# Patient Record
Sex: MALE | Race: ASIAN | NOT HISPANIC OR LATINO | Employment: UNEMPLOYED | URBAN - METROPOLITAN AREA
[De-identification: names, ages, dates, MRNs, and addresses within clinical notes are randomized per-mention and may not be internally consistent; named-entity substitution may affect disease eponyms.]

---

## 2023-01-12 ENCOUNTER — TRANSFERRED RECORDS (OUTPATIENT)
Dept: HEALTH INFORMATION MANAGEMENT | Facility: CLINIC | Age: 28
End: 2023-01-12

## 2023-10-30 ENCOUNTER — TRANSFERRED RECORDS (OUTPATIENT)
Dept: HEALTH INFORMATION MANAGEMENT | Facility: CLINIC | Age: 28
End: 2023-10-30
Payer: COMMERCIAL

## 2023-10-30 LAB — PHQ9 SCORE: 0

## 2023-12-22 ENCOUNTER — TELEPHONE (OUTPATIENT)
Dept: BEHAVIORAL HEALTH | Facility: CLINIC | Age: 28
End: 2023-12-22

## 2023-12-22 NOTE — TELEPHONE ENCOUNTER
12/22/2023    Received a voicemail from [facility]  Jaskaranmaureen Harris. She was inquiring where pt is at on the wait list.     I called her back to clarify, the referral from 10/30/2023. She reported yes, and when she sent it over she was told the wait list was into January so they were checking on that status. I told her I would reach out to admissions to figure that out. Told CM that I would call back or admissions would.    Number: [phone]    Sent an email to  Admissions Lucinda PAYNE and Anmol PAZ regarding this information.     Lisa Dudley [facility] - Patient Navigator   [email or web address]  Direct phone: [phone]

## 2024-01-05 NOTE — TELEPHONE ENCOUNTER
Jessica Harris called writer to follow up on referral for patient. Per Assessment scanned into epic that was completed on October 30th 2023. Pt would need an updated assessment.  was wondering why it took so long to be reviewed. Caller was transferred to [facility] Admissions for follow up.

## 2024-01-16 NOTE — TELEPHONE ENCOUNTER
Writer received assessment [facility]. [facility].    Pt is on [facility] wait list language services as pt needs a Shira .     There is no listed contact information for either the  or the patient on the assessment.

## 2024-01-22 NOTE — TELEPHONE ENCOUNTER
Rec'd vm from someone at [place] (name was indistinct on vm) req'ing an update on the referral made 1-16-24. Writer teo reporting that there is no status change in the referral due to pt appearing not to have health insurance and [facility] not providing a contact number, and inviting the agency to call back.

## 2024-01-26 NOTE — TELEPHONE ENCOUNTER
Tiffanier spoke with the patient via phone with [facility]  ID# [id number]  (April) to schedule a direct admit for lodging plus on Wednesday 1/31/24 at 10am.    You must remain sober from ALL substances for 24 hours prior to admission otherwise you will not be allowed to admit into [facility]      A urine drug screen and breathalyzer will be required when you arrive on campus, please do not use the restroom until testing has been completed      You can bring a cell phone for virtual visitation or virtual Dr. Visits. In person visitation will be on Sunday from 1:30pm-4:30pm. Pt will be allowed to have one person approved for visitation. No children UNDER 5 allowed. Virtual Visitation is still Tuesday/Sunday from 5:45pm-6:45pm.     You can bring 1 suitcase of belongings. You WILL get a roommate, so we want to minimize the amount of things in the room.     Medications- Try to bring a 30-day supply. Keep them in a separate bag to hand to the nurse.      No off-campus appointments allowed.     SMOKING: You can bring cigarettes and tobacco, but we will keep them locked up. No Vapes allowed. Pt can go out 4 times a day to smoke.     You must be on time for your scheduled appointment, if you are running late, you must call the  at [phone]          ?Pt acknowledged the above.?

## 2024-01-31 ENCOUNTER — TELEPHONE (OUTPATIENT)
Dept: BEHAVIORAL HEALTH | Facility: CLINIC | Age: 29
End: 2024-01-31

## 2024-01-31 NOTE — TELEPHONE ENCOUNTER
Patient was scheduled for a 10am [facility] Admission today 1/31/24. Patient did not show up. Writer tried calling patient but he didn't answer and doesn't have a voicemail box set up.

## 2024-02-05 NOTE — TELEPHONE ENCOUNTER
received a call from May with [facility] regarding the patients admission date. May stated pt was sick at the time of his admission and that is why he missed his admit date. Pt would like to be rescheduled if possible. Writer rescheduled patient for 2/8/24 at 10am. Pt is aware he needs to be sober for 24 hours prior to admission.

## 2024-02-07 ENCOUNTER — OFFICE VISIT (OUTPATIENT)
Dept: BEHAVIORAL HEALTH | Facility: CLINIC | Age: 29
End: 2024-02-07
Payer: COMMERCIAL

## 2024-02-09 ENCOUNTER — HOSPITAL ENCOUNTER (OUTPATIENT)
Dept: BEHAVIORAL HEALTH | Facility: CLINIC | Age: 29
Discharge: HOME | End: 2024-02-09
Attending: FAMILY MEDICINE
Payer: COMMERCIAL

## 2024-02-09 VITALS
SYSTOLIC BLOOD PRESSURE: 111 MMHG | HEART RATE: 89 BPM | DIASTOLIC BLOOD PRESSURE: 81 MMHG | TEMPERATURE: 97.5 F | OXYGEN SATURATION: 100 %

## 2024-02-09 PROBLEM — F19.20 CHEMICAL DEPENDENCY (H): Status: ACTIVE | Noted: 2024-02-09

## 2024-02-09 LAB — FENTANYL UR QL: NORMAL

## 2024-02-09 PROCEDURE — 1002N00001 HC LODGING PLUS FACILITY CHARGE ADULT

## 2024-02-09 PROCEDURE — H2035 A/D TX PROGRAM, PER HOUR: HCPCS

## 2024-02-09 PROCEDURE — 80307 DRUG TEST PRSMV CHEM ANLYZR: CPT | Performed by: FAMILY MEDICINE

## 2024-02-09 RX ORDER — IBUPROFEN 200 MG
400 TABLET ORAL EVERY 6 HOURS PRN
COMMUNITY
End: 2024-02-26

## 2024-02-09 RX ORDER — AMOXICILLIN 250 MG
2 CAPSULE ORAL DAILY PRN
COMMUNITY
End: 2024-02-26

## 2024-02-09 RX ORDER — ACETAMINOPHEN 325 MG/1
325-650 TABLET ORAL EVERY 4 HOURS PRN
COMMUNITY
End: 2024-02-26

## 2024-02-09 RX ORDER — LORATADINE 10 MG/1
10 TABLET ORAL DAILY PRN
COMMUNITY
End: 2024-02-26

## 2024-02-09 RX ORDER — LANOLIN ALCOHOL/MO/W.PET/CERES
3 CREAM (GRAM) TOPICAL
COMMUNITY
End: 2024-02-26

## 2024-02-09 RX ORDER — MAGNESIUM HYDROXIDE/ALUMINUM HYDROXICE/SIMETHICONE 120; 1200; 1200 MG/30ML; MG/30ML; MG/30ML
30 SUSPENSION ORAL EVERY 6 HOURS PRN
COMMUNITY
End: 2024-02-26

## 2024-02-09 RX ORDER — GUAIFENESIN/DEXTROMETHORPHAN 100-10MG/5
10 SYRUP ORAL EVERY 4 HOURS PRN
COMMUNITY
End: 2024-02-26

## 2024-02-09 ASSESSMENT — PATIENT HEALTH QUESTIONNAIRE - PHQ9: SUM OF ALL RESPONSES TO PHQ QUESTIONS 1-9: 0

## 2024-02-09 ASSESSMENT — ANXIETY QUESTIONNAIRES
3. WORRYING TOO MUCH ABOUT DIFFERENT THINGS: NOT AT ALL
5. BEING SO RESTLESS THAT IT IS HARD TO SIT STILL: NOT AT ALL
6. BECOMING EASILY ANNOYED OR IRRITABLE: NOT AT ALL
4. TROUBLE RELAXING: NOT AT ALL
7. FEELING AFRAID AS IF SOMETHING AWFUL MIGHT HAPPEN: NOT AT ALL
GAD7 TOTAL SCORE: 0
1. FEELING NERVOUS, ANXIOUS, OR ON EDGE: NOT AT ALL
GAD7 TOTAL SCORE: 0
2. NOT BEING ABLE TO STOP OR CONTROL WORRYING: NOT AT ALL

## 2024-02-09 NOTE — PROGRESS NOTES
Initial Services Plan    Before your first treatment group, please do the following    Immediate health & safety concerns: Look for sober housing and a supportive social network.  Look for a support network (such as AA, NA, DBT group, a Spiritism group, etc.)    Suggestions for client during the time between intake & completion of treatment plan:  Tour your treatment center (unit or outpatient clinic).  Introduce yourself to the treatment group.  Spend time getting to know your peers.  Complete your psycho-social paperwork.  Complete the problem list for your treatment plan.  Start drug and alcohol use history.  Review your patient or client handbook.    Client issues to be addressed in the first treatment sessions:  Other Pt denies at this time    MORENITA Jin  2/9/2024
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
## 2024-02-09 NOTE — PROGRESS NOTES
[facility] Nursing Health Assessment      Vital signs:     See flow sheet       Direct admission    Counselor: Group B  Drug of Choice: Methamphetamines and THC  Last use: 2 days ago   Home clinic/MD: Set pt up for PCP at [facility]   Psychiatrist/therapist: none    Addiction med appt will be set up     Medical history/current conditions:  none    H&P Screen:  H&P within the last 90 days: No.  Patient has been informed they are required to obtain an H&P within the next 14 days.    [facility]    Mental Health diagnosis: none  Medication compliant?: yes  Recent sucidal thoughts? no     When? na  Current thought of self-harm? no    Plan? na    Pain assessment:   Pt. Experiencing pain at this time?  No    LP Falls assessment    Has patient had a fall(s) in the last 6 months?  No  If yes, what were the circumstances surrounding the fall(s)? na  Does patient have gait dysfunctions? (limping, dragging of toes, shuffling feet, unsteadiness, difficulty standing /walking)  No  Does patient appear to have deconditioning/muscle loss/malnutrition/fatigue? (due to inactivity, bedrest (long hospitalization), medical condition(s) No  Does patient experience confusion, dizziness or vertigo? No  Is patient visually impaired? No   Does patient have any adaptive equipment (hearing aids, wheelchair, walker, prosthesis, crutches, cane, etc..) No  Is patient taking 2 or more of the following medications?  anticonvulsants, anti-hypertensives, diuretics, laxatives, sedatives, and psychotropics (single-select) No  Is patient taking medication (s) that would cause urinary or bowel urgency (ex: lactulose/Furosemide)? No  Does patient have a medical condition (ex: Diabetes, Liver Disease, Respiratory Diseases, Chronic Pain, Heart Disease, Neuropathy, Seizure like Disorder, etc...) that could affect balance/gait or risk for falls?  No  ____________________________________________________________________________________________________________________________     Community Medical Screen for COVID-19    Do you have any of the following NEW or worsening symptoms NOT attributed to pre-existing conditions?    No    Fever of 100.0  F (37.8 C) or over  Chills  Cough  Shortness of Breath  Loss of taste or smell  Generalized body aches  Persistent headache  Sore throat (or trouble eating or drinking in young children?)  Nausea, Vomiting, or diarrhea (loose stools)    Did you test positive for COVID-19 in the last 10 days or are you waiting on the test results due to an exposure or symptoms?  No    Has anyone told you to self-quarantine due to exposure to someone with COVID-19?  No    If pt responds   YES to any of the symptoms or  Positive COVID-19 result in the last 10 days with or without symptoms or YES to symptoms with pending results ptwill need to leave unit immediately and can return in 11 days from discharge date.    ______________________________________________________________________________________________________________________    COVID-19 Test completed by L[facility] ? No    COVID-19 - Pt informed of the following while at LP:    1) If pt has any of the symptoms below, notify staff immediately.    Fever   Cough   Shortness of breath or difficulty breathing   Chills   Repeated shaking with chills  Muscle pain     RN Assessment of Infectious Disease concerns:    Infectious disease concerns None    RN Assessment of Patient's Ability to Safely Manage and Self-Administer Respiratory Treatments    Has experience in the management of Respiratory (If NA, indicate and move to Integrative Therapies):  Na  Integrative Therapies: Essential Oils    Patient requesting essential oil inhaler to manage (Mood/Mental Health/Physical/Spiritual symptoms).     Discussed appropriate use of essential oil inhalers and instructed patient not to leave labeled product  out on unit.     Patient was screened for kidney disease, asthma/reactive airway disease and rashes and wounds or 1st trimester of pregnancy    List Essential Oils requested by pt declined     Patient verbalized and demonstrated understanding of how to use essential oil inhaler correctly and will notify LP RN with any concerns or side effects. Patient agrees not to share their essential oil inhaler with other clients.  Continue to support the patient in safely utilizing integrative therapies as able to manage symptoms during treatment.     Patient tobacco use: 5-6 cigarettes   Are you interested in quitting? yes    NRT (Nicotine Replacement therapy) ordered? declined   Pt is aware of the dangers of tobacco cessation and in contemplation.    Pt given written education.    Nutritional Assessment:    Have you ever purged, binged or restricted yourself as a way to control your weight?   No     Are you on a special diet?   No     Do you have any concerns regarding your nutritional status?   No     Have you had any appetite changes in the last 3 months?   No   Have you had weight loss or weight gain of more than 10 lbs in the last 3 months?   If patient gained or lost more than 10 lbs, then refer to program RN / attending Physician for assessment.   No   Was the patient informed of BMI?    Normal, No Intervention   Yes   Have you engaged in any risk-taking behavior that would put you at risk for exposure to blood-borne or sexually transmitted diseases?   No   Do you have any dental problems?   No       Pt verbalizes understanding of the above criteria yes    [facility]  Nurse Liaison / CD Adult [facility]  O: [phone]  Fax:[phone]  [facility] [id number]  M-F: 7AM to 5:30PM   Sat-Sun: 7AM to 3PM  After hours: [phone]     Nursing Assessment Summary:  As above     On-going nursing intervention required?   No    Acute care visit recommended: no

## 2024-02-09 NOTE — PROGRESS NOTES
Progress Note    This patient had a Full Comprehensive Substance Use Disorder assessment on 1/12/2024 completed by MORENITA Brice.  This patient was seen for a face to face update of the Full Comprehensive Substance Use Disorder assessment on 2/9/2024 by MORENITA Jin.  OUTSIDE: A paper copy of the Full Comprehensive Substance Use Disorder assessment will be placed in the patient's paper chart until being scanned into the patient's electronic medical record in Epic under the Media tab.    Alcohol/Drug use since the last CD evaluation (include date of last use):     Pt reports last date of use as 2/7/2024     Please note any other clinical changes since the last CD evaluation (such as medication changes, additional legal charges, detoxification admissions, overdoses, etc.)     No significant changes since the last CD evaluation       ASAM Dimensions Original scores Current Scores   I.) Intoxication and Withdrawal: 0 0   II.) Biomedical:  0 0   III.) Emotional and Behavioral:  1 1   IV.) Readiness to Change:  3 3   V.) Relapse Potential: 4 4   VI.) Recovery Environmental: 3 3     Please list clinical justifications for the above ASAM score changes since the original comprehensive assessment:     None of the ASAM scores on the six dimensions had changed since the Full Comprehensive Substance Use Disorder assessment was completed on 1/12/2024.       Current VIRY: Current UA:     0.000     Positive for Amphetamines, Methamphetamine, and THC and negative for all other screened drugs.       PHQ-9, ED-7   PHQ-9 on 2/9/2024 ED-7 on 2/9/2024   The patient's PHQ-9 score was 0 out of 27, indicating no depression.   The patient's ED-7 score was 0 out of 21, indicating minimal anxiety.       C[place]man-Suicide Severity Rating Scale Reassessment   Have you ever wished you were dead or that you could go to sleep and not wake up?  Past Month:  No     Have you actually had any thoughts of killing yourself?  Past Month:  No    "  Have you been thinking about how you might do this?     Past Month:  No   Lifetime:  No   Have you had these thoughts and had some intention of acting on them?     Past Month:  No   Lifetime:  No   Have you started to work out the details of how to kill yourself?   Past Month:  No   Lifetime:  No   Do you intend to carry out this plan?   No     When you have the thoughts how long do they last?  The patient denied ever having any suicidal thoughts in life.     Are there things - anyone or anything (i.e. family, Mandaen, pain of death) that stopped you from wanting to die or acting on thoughts of suicide?  Does not apply       2008  [facility]  Used with permission by Patti Jefferson, PhD.       Guide to C-SSRS Risk Ratings   NO IDEATION:  with no active thoughts IDEATION: with a wish to die. IDEATION: with active thoughts. Risk Ratings   If Yes No No 0 - Very Low Risk   If NA Yes No 1 - Low Risk   If NA Yes Yes 2 - Low/moderate risk   IDEATION: associated thoughts of methods without intent or plan INTENT: Intent to follow through on suicide PLAN: Plan to follow through on suicide Risk Ratings cont...   If Yes No No 3 - Moderate Risk   If Yes Yes No 4 - High Risk   If Yes Yes Yes 5 - High Risk   The patient's ADDITIONAL RISK FACTORS and lack of PROTECTIVE FACTORS may increase their overall suicide risk ratings.     Additional Risk Factors:   Significant legal problems including being at risk of incarceration    No additional risk factors   Protective Factors:   No significant protective factors     Risk Status   0. - Very Low Risk:  Evaluation Counselors:  Document in Epic / SBAR to counselor \"Very Low Risk\".      Treatment Counselors:  Reassess upon admission as applicable, assess weekly in progress notes under Dimension 3 and summarize in Discharge / Treatment summary under Dimension 3.     Additional information to support suicide risk rating: There was no additional " information to provide at this time.

## 2024-02-09 NOTE — PROGRESS NOTES
Acknowledgement of Current Treatment Plan - Initial Treatment Plan     INITIAL TREATMENT PLAN:     1. I have participated in creating my treatment plan with my therapist / counselor on 2/9/2024.     I agree with the plan as it is written in the electronic health record.    Name Signature/Date   Sarah Chano Than    Name of Therapist / Counselor Signature/Date   MORENITA Kang, [facility]      2. I have completed and reviewed my Safety Plan with my counselor and signed this on __________. I have been given the hard copy of this plan.    Patient signature/date:      ________________________________________________________________    3. Last Use Date: 2/7/24    Patient signature/date:     ________________________________________________________________

## 2024-02-09 NOTE — PROGRESS NOTES
Comprehensive Assessment Summary      Based on client interview, review of previous assessments and   comprehensive assessment interview the following diagnosis and recommendations are:      Patient: Sarah Keenan  MRN: [id number]  : 1995  Age: 28 years old Sex: Male         Client meets criteria for:  Cannabis Use Disorder Severe - 304.30 (F12.20)  Amphetamine Use Disorder Severe - 304.40 (F15.20)     Dimension One: Acute Intoxication/Withdrawal Potential     Ratin   (Consider the client's ability to cope with withdrawal symptoms and current state of intoxication)    Patient reports his last date of use as 24. He denies any current withdrawal symptoms. He will continue to be monitored throughout treatment.     Dimension Two: Biomedical Condition and Complications    Ratin  (Consider the degree to which any physical disorder would interfere with treatment for substance abuse, and the client's ability to tolerate any related discomfort; determine the impact of continued chemical use on the unborn child if the client is pregnant)    Patient denies any biomedical concerns that would interfere with his ability to participate in treatment programming. He is working with  staff to set up a PCP appointment at [facility]. He will continue to be monitored throughout treatment.     Dimension Three: Emotional/Behavioral/Cognitive Conditions & Complications   Ratin  (Determine the degree to which any condition or complications are likely to interfere with treatment for substance abuse or with functioning in significant life areas and the likelihood of risk of harm to self or others)    Patient denies any mental health diagnoses. He lacks sober coping skills to manage stressors and interpersonal conflict. Upon admission, client's PHQ-9 score was 0/27, indicating no depression.  Upon admission, client's ED-7 score was 0/21, indicating minimal anxiety.  Patient's suicide risk assessment rated them  "as \"Very Low Risk\".  Patient will continue to be monitored throughout treatment.     Dimension Four: Treatment Acceptance/Resistance     Rating: 3  (Consider the amount of support and encouragement necessary to keep the client involved in treatment)     Patient has continued to use substances despite consequences to himself and others. Patient has minimal insight into the nature of his substance use disorder. He has external motivation at this time due to legal involvement. He appears to be in the contemplation stage of change at this time.     Dimension Five: Continued Use/Relapse Prevention     Ratin  (Consider the degree to which the client's recognizes relapse issues and has the skills to prevent relapse of either substance use or mental health problems)     Patient has limited insight into his personal relapse process, triggers, warning signs, and lacks sober coping skills. Patient was referred to [facility] from [facility] outpatient services, as he continued to have positive UA results while in programming. Patient has been unable to maintain sobriety. He remains a high risk for relapse at this time.    Dimension Six: Recovery Environment    Rating: 3  (Consider the degree to which key areas of the client's life are supportive of or antagonistic to treatment participation and recovery)     Patient reports he lives with his aunt, uncle, and their 2 children. He is single and denies having children. Patient is on probation in [facility]. Patient has limited sober support. He is currently unemployed.     I have reviewed the information on the assessment, psychosocial and medical history and checklist:        it is current  "

## 2024-02-09 NOTE — PROGRESS NOTES
Name: Sarah Keenan  Date: 2/9/2024  Medical Record: [id number]    Envelope Number: [id number]  List of Contents (List each item separately in new row):   One Cell phone (Cracked screen), Two  & cords,  One ear phone (skull candy)  Admission:  I am responsible for any personal items that are not sent to the safe or pharmacy.  [facility] is not responsible for loss, theft or damage of any property in my possession.    Patient Signature:  ___________________________________________       Date/Time:__________________________    Staff Signature: __________________________________       Date/Time:__________________________    [facility] Staff person, if patient is unable/unwilling to sign:      __________________________________________________________       Date/Time: __________________________    **All medications are packaged by LP staff and securely stored on [employer] plus. Medications left by patients at discharge will be packaged by LP staff and transported by [facility] staff to inpatient pharmacy for storage.**    Discharge:  [facility] has returned all of my personal belongings:    Patient Signature: ________________________________________     Date/Time: ____________________________________    Staff Signature: ______________________________________     Date/Time:_____________________________________

## 2024-02-10 ENCOUNTER — HOSPITAL ENCOUNTER (OUTPATIENT)
Dept: BEHAVIORAL HEALTH | Facility: CLINIC | Age: 29
Discharge: HOME | End: 2024-02-10
Attending: FAMILY MEDICINE
Payer: COMMERCIAL

## 2024-02-10 PROCEDURE — 1002N00001 HC LODGING PLUS FACILITY CHARGE ADULT

## 2024-02-10 PROCEDURE — H2035 A/D TX PROGRAM, PER HOUR: HCPCS | Mod: HQ

## 2024-02-10 PROCEDURE — H2035 A/D TX PROGRAM, PER HOUR: HCPCS

## 2024-02-10 NOTE — GROUP NOTE
Group Therapy Documentation    PATIENT'S NAME: Sarah Keenan  MRN:   [id number]  :   1995  ACCT. NUMBER: [id number]  DATE OF SERVICE: 2/10/24  START TIME: 12:30 PM  END TIME:  2:30 PM  FACILITATOR(S): Nafisa Youngblood LADC; Bg Martin LADC; Mariola Starks LADC  TOPIC: BEH Group Therapy  Number of patients attending the group:  24  Group Length:  2 Hours    Group Therapy Type: Recovery strategies and Daily living/independence skills    Summary of Group / Topics Discussed:    Relationship/socialization, Emotions/expression, and Relapse prevention    Group Attendance:  Attended group session    Patient's response to the group topic/interactions:  cooperative with task    Patient appeared to be Actively participating, Attentive, and Engaged.        Client specific details: Pt listened respectfully to presentations on communication types and styles, and took part in the related activities.

## 2024-02-10 NOTE — GROUP NOTE
Psychoeducation Group Documentation    PATIENT'S NAME: Sarah Keenan  MRN:   [id number]  :   1995  ACCT. NUMBER: [id number]  DATE OF SERVICE: 2/10/24  START TIME:  9:00 AM  END TIME: 11:00 AM  FACILITATOR(S): Bg Martin LADC; Nafisa Youngblood LADC; Mariola Starks LADC  TOPIC: BEH Pyschoeducation  Number of patients attending the group:  26  Group Length:  2 Hours    Skills Group Therapy Type: Recovery skills    Summary of Group / Topics Discussed:    Relapse prevention skills        Group Attendance:  Attended group session    Patient's response to the group topic/interactions:  cooperative with task and listened actively    Patient appeared to be Attentive and Engaged.         Client specific details:  Sarah gave appropriate feedback. .

## 2024-02-11 ENCOUNTER — HOSPITAL ENCOUNTER (OUTPATIENT)
Dept: BEHAVIORAL HEALTH | Facility: CLINIC | Age: 29
Discharge: HOME | End: 2024-02-11
Attending: FAMILY MEDICINE
Payer: COMMERCIAL

## 2024-02-11 PROCEDURE — H2035 A/D TX PROGRAM, PER HOUR: HCPCS | Mod: HQ

## 2024-02-11 PROCEDURE — 1002N00001 HC LODGING PLUS FACILITY CHARGE ADULT

## 2024-02-11 NOTE — GROUP NOTE
Psychoeducation Group Documentation    PATIENT'S NAME: Sarah Keenan  MRN:   [id number]  :   1995  ACCT. NUMBER: [id number]  DATE OF SERVICE: 24  START TIME:  8:40 AM  END TIME: 10:30 AM  FACILITATOR(S): Bg Martin LADC; Rosanna Gipson RN; Nafisa Youngblood LADC  TOPIC: BEH Pyschoeducation  Number of patients attending the group:  25  Group Length:  2 Hours    Skills Group Therapy Type: Healthy behaviors development    Summary of Group / Topics Discussed:    Balanced lifestyle skills        Group Attendance:  Attended group session    Patient's response to the group topic/interactions:  cooperative with task and listened actively    Patient appeared to be Attentive and Engaged.         Client specific details:  Sarah gave appropriate feedback. .

## 2024-02-11 NOTE — GROUP NOTE
Psychoeducation Group Documentation    PATIENT'S NAME: Sarah Keenan  MRN:   [id number]  :   1995  ACCT. NUMBER: [id number]  DATE OF SERVICE: 24  START TIME:  7:36 AM  END TIME:  1:30 PM  FACILITATOR(S): Bg Martin LADC; Nafisa Youngblood LADC  TOPIC: BEH Pyschoeducation  Number of patients attending the group:  25  Group Length:  1 Hours    Skills Group Therapy Type: Healthy behaviors development    Summary of Group / Topics Discussed:    Relationship/social skills        Group Attendance:  Attended group session    Patient's response to the group topic/interactions:  cooperative with task and listened actively    Patient appeared to be Attentive.         Client specific details:  Sarah gave appropriate feedback. .

## 2024-02-12 ENCOUNTER — HOSPITAL ENCOUNTER (OUTPATIENT)
Dept: BEHAVIORAL HEALTH | Facility: CLINIC | Age: 29
Discharge: HOME | End: 2024-02-12
Attending: FAMILY MEDICINE
Payer: COMMERCIAL

## 2024-02-12 PROCEDURE — H2035 A/D TX PROGRAM, PER HOUR: HCPCS | Mod: HQ

## 2024-02-12 PROCEDURE — 1002N00001 HC LODGING PLUS FACILITY CHARGE ADULT

## 2024-02-12 NOTE — GROUP NOTE
Group Therapy Documentation    PATIENT'S NAME: Sarah Keenan  MRN:   [id number]  :   1995  ACCT. NUMBER: [id number]  DATE OF SERVICE: 24  START TIME:  9:00 AM  END TIME: 11:00 AM  FACILITATOR(S): Bg Martin LADC  TOPIC: BEH Group Therapy  Number of patients attending the group: 8  Group Length:  2 Hours    Group Therapy Type: Recovery strategies    Summary of Group / Topics Discussed:    Recovery Principles, Sober coping skills, Balanced lifestyle, Disease of addiction, Emotions/expression, and Relapse prevention      Group Attendance:  Attended group session    Patient's response to the group topic/interactions:  cooperative with task, discussed personal experience with topic, expressed readiness to alter behaviors, expressed understanding of topic, gave appropriate feedback to peers, listened actively, and offered helpful suggestions to peers    Patient appeared to be Actively participating, Attentive, and Engaged.        Client specific details:  Sarah gave appropriate feedback. .

## 2024-02-12 NOTE — GROUP NOTE
Group Therapy Documentation    PATIENT'S NAME: Sarah Keenan  MRN:   [id number]  :   1995  ACCT. NUMBER: [id number]  DATE OF SERVICE: 24  START TIME: 12:30 PM  END TIME:  2:30 PM  FACILITATOR(S): Edna Plasencia LADC  TOPIC: BEH Group Therapy  Number of patients attending the group:  8  Group Length:  2 Hours    Group Therapy Type: Emotion processing    Summary of Group / Topics Discussed:    Disease of addiction and Relapse prevention      Group Attendance:  Attended group session    Patient's response to the group topic/interactions:  cooperative with task    Patient appeared to be Actively participating, Attentive, and Engaged.        Client specific details: Sarah was an active participant in afternoon group therapy session. He participated in a goal-setting exercise and offered supportive feedback to his peers who shared treatment plan assignments.

## 2024-02-13 ENCOUNTER — APPOINTMENT (OUTPATIENT)
Dept: CT IMAGING | Facility: CLINIC | Age: 29
End: 2024-02-13
Attending: EMERGENCY MEDICINE
Payer: COMMERCIAL

## 2024-02-13 ENCOUNTER — HOSPITAL ENCOUNTER (EMERGENCY)
Facility: CLINIC | Age: 29
Discharge: HOME | End: 2024-02-13
Attending: EMERGENCY MEDICINE | Admitting: EMERGENCY MEDICINE
Payer: COMMERCIAL

## 2024-02-13 ENCOUNTER — HOSPITAL ENCOUNTER (OUTPATIENT)
Dept: BEHAVIORAL HEALTH | Facility: CLINIC | Age: 29
Discharge: HOME | End: 2024-02-13
Attending: FAMILY MEDICINE
Payer: COMMERCIAL

## 2024-02-13 VITALS
DIASTOLIC BLOOD PRESSURE: 68 MMHG | OXYGEN SATURATION: 95 % | HEART RATE: 96 BPM | SYSTOLIC BLOOD PRESSURE: 118 MMHG | TEMPERATURE: 97.4 F | RESPIRATION RATE: 17 BRPM

## 2024-02-13 VITALS — DIASTOLIC BLOOD PRESSURE: 80 MMHG | SYSTOLIC BLOOD PRESSURE: 110 MMHG | HEART RATE: 100 BPM

## 2024-02-13 DIAGNOSIS — J32.9 SINUSITIS, UNSPECIFIED CHRONICITY, UNSPECIFIED LOCATION: ICD-10-CM

## 2024-02-13 LAB
ALBUMIN SERPL BCG-MCNC: 4.3 G/DL (ref 3.5–5.2)
ALP SERPL-CCNC: 82 U/L (ref 40–150)
ALT SERPL W P-5'-P-CCNC: 79 U/L (ref 0–70)
ANION GAP SERPL CALCULATED.3IONS-SCNC: 12 MMOL/L (ref 7–15)
AST SERPL W P-5'-P-CCNC: 62 U/L (ref 0–45)
BASOPHILS # BLD AUTO: 0.1 10E3/UL (ref 0–0.2)
BASOPHILS NFR BLD AUTO: 1 %
BILIRUB SERPL-MCNC: 0.2 MG/DL
BUN SERPL-MCNC: 15.5 MG/DL (ref 6–20)
CALCIUM SERPL-MCNC: 9.3 MG/DL (ref 8.6–10)
CHLORIDE SERPL-SCNC: 100 MMOL/L (ref 98–107)
CREAT SERPL-MCNC: 0.76 MG/DL (ref 0.67–1.17)
DEPRECATED HCO3 PLAS-SCNC: 27 MMOL/L (ref 22–29)
EGFRCR SERPLBLD CKD-EPI 2021: >90 ML/MIN/1.73M2
EOSINOPHIL # BLD AUTO: 0.4 10E3/UL (ref 0–0.7)
EOSINOPHIL NFR BLD AUTO: 4 %
ERYTHROCYTE [DISTWIDTH] IN BLOOD BY AUTOMATED COUNT: 13.1 % (ref 10–15)
GLUCOSE SERPL-MCNC: 98 MG/DL (ref 70–99)
HCT VFR BLD AUTO: 45.1 % (ref 40–53)
HGB BLD-MCNC: 15.2 G/DL (ref 13.3–17.7)
HOLD SPECIMEN: NORMAL
HOLD SPECIMEN: NORMAL
IMM GRANULOCYTES # BLD: 0.2 10E3/UL
IMM GRANULOCYTES NFR BLD: 2 %
LYMPHOCYTES # BLD AUTO: 2.8 10E3/UL (ref 0.8–5.3)
LYMPHOCYTES NFR BLD AUTO: 24 %
MAGNESIUM SERPL-MCNC: 2.1 MG/DL (ref 1.7–2.3)
MCH RBC QN AUTO: 29.5 PG (ref 26.5–33)
MCHC RBC AUTO-ENTMCNC: 33.7 G/DL (ref 31.5–36.5)
MCV RBC AUTO: 88 FL (ref 78–100)
MONOCYTES # BLD AUTO: 1 10E3/UL (ref 0–1.3)
MONOCYTES NFR BLD AUTO: 9 %
NEUTROPHILS # BLD AUTO: 7.1 10E3/UL (ref 1.6–8.3)
NEUTROPHILS NFR BLD AUTO: 60 %
NRBC # BLD AUTO: 0 10E3/UL
NRBC BLD AUTO-RTO: 0 /100
PLATELET # BLD AUTO: 337 10E3/UL (ref 150–450)
POTASSIUM SERPL-SCNC: 4.5 MMOL/L (ref 3.4–5.3)
PROT SERPL-MCNC: 7.7 G/DL (ref 6.4–8.3)
RBC # BLD AUTO: 5.15 10E6/UL (ref 4.4–5.9)
SODIUM SERPL-SCNC: 139 MMOL/L (ref 135–145)
WBC # BLD AUTO: 11.5 10E3/UL (ref 4–11)

## 2024-02-13 PROCEDURE — 70450 CT HEAD/BRAIN W/O DYE: CPT

## 2024-02-13 PROCEDURE — 82040 ASSAY OF SERUM ALBUMIN: CPT | Performed by: EMERGENCY MEDICINE

## 2024-02-13 PROCEDURE — H2035 A/D TX PROGRAM, PER HOUR: HCPCS | Mod: HQ

## 2024-02-13 PROCEDURE — 250N000011 HC RX IP 250 OP 636: Performed by: EMERGENCY MEDICINE

## 2024-02-13 PROCEDURE — 96361 HYDRATE IV INFUSION ADD-ON: CPT | Performed by: EMERGENCY MEDICINE

## 2024-02-13 PROCEDURE — 258N000003 HC RX IP 258 OP 636: Performed by: EMERGENCY MEDICINE

## 2024-02-13 PROCEDURE — 70496 CT ANGIOGRAPHY HEAD: CPT

## 2024-02-13 PROCEDURE — 85025 COMPLETE CBC W/AUTO DIFF WBC: CPT | Performed by: EMERGENCY MEDICINE

## 2024-02-13 PROCEDURE — 250N000013 HC RX MED GY IP 250 OP 250 PS 637: Performed by: EMERGENCY MEDICINE

## 2024-02-13 PROCEDURE — 96360 HYDRATION IV INFUSION INIT: CPT | Mod: 59 | Performed by: EMERGENCY MEDICINE

## 2024-02-13 PROCEDURE — 83735 ASSAY OF MAGNESIUM: CPT | Performed by: EMERGENCY MEDICINE

## 2024-02-13 PROCEDURE — 99284 EMERGENCY DEPT VISIT MOD MDM: CPT | Performed by: EMERGENCY MEDICINE

## 2024-02-13 PROCEDURE — 1002N00001 HC LODGING PLUS FACILITY CHARGE ADULT

## 2024-02-13 PROCEDURE — 250N000009 HC RX 250: Performed by: EMERGENCY MEDICINE

## 2024-02-13 PROCEDURE — 99285 EMERGENCY DEPT VISIT HI MDM: CPT | Mod: 25 | Performed by: EMERGENCY MEDICINE

## 2024-02-13 PROCEDURE — 36415 COLL VENOUS BLD VENIPUNCTURE: CPT | Performed by: EMERGENCY MEDICINE

## 2024-02-13 RX ORDER — AMOXICILLIN 500 MG/1
500 CAPSULE ORAL 3 TIMES DAILY
Qty: 30 CAPSULE | Refills: 0 | Status: SHIPPED | OUTPATIENT
Start: 2024-02-13 | End: 2024-02-23

## 2024-02-13 RX ORDER — IOPAMIDOL 755 MG/ML
100 INJECTION, SOLUTION INTRAVASCULAR ONCE
Status: COMPLETED | OUTPATIENT
Start: 2024-02-13 | End: 2024-02-13

## 2024-02-13 RX ORDER — AMOXICILLIN 500 MG/1
500 CAPSULE ORAL ONCE
Status: COMPLETED | OUTPATIENT
Start: 2024-02-13 | End: 2024-02-13

## 2024-02-13 RX ADMIN — IOPAMIDOL 67 ML: 755 INJECTION, SOLUTION INTRAVENOUS at 17:06

## 2024-02-13 RX ADMIN — SODIUM CHLORIDE 1000 ML: 9 INJECTION, SOLUTION INTRAVENOUS at 16:07

## 2024-02-13 RX ADMIN — SODIUM CHLORIDE 80 ML: 9 INJECTION, SOLUTION INTRAVENOUS at 17:07

## 2024-02-13 RX ADMIN — AMOXICILLIN 500 MG: 500 CAPSULE ORAL at 20:47

## 2024-02-13 ASSESSMENT — ACTIVITIES OF DAILY LIVING (ADL)
ADLS_ACUITY_SCORE: 35

## 2024-02-13 NOTE — PROGRESS NOTES
1300 - pt complaining of head spinning, pounding back of head and burning eye. Escorted to ED. ED RN notified. LP Counselors notified.    P - 100  B/P 110/80    Rosanna Gipson RN    [facility]  Nurse Liaison / CD [facility] (LP)  [address]  O:[phone]  F:[phone]  RN [place] [id number]  LP After hours(UC):[phone]  LP admin counselor:[phone]  CD assess:[phone]

## 2024-02-13 NOTE — GROUP NOTE
Group Therapy Documentation    PATIENT'S NAME: Sarah Keenan  MRN:   [id number]  :   1995  ACCT. NUMBER: [id number]  DATE OF SERVICE: 24  START TIME:  3:00 PM  END TIME:  4:00 PM  FACILITATOR(S): Donald Schwartz LADC; Mariola Starks LADC; Malorie Gutierrez LADC  TOPIC: BEH Group Therapy  Number of patients attending the group:  27  Group Length:  1 Hours    Group Therapy Type: Recovery strategies    Summary of Group / Topics Discussed:    Recovery Principles, Sober coping skills, and Leisure explorations/use of leisure time        Group Attendance:  Excused from group session due to being in the ED.

## 2024-02-13 NOTE — GROUP NOTE
Group Therapy Documentation    PATIENT'S NAME: Sarah Keenan  MRN:   [id number]  :   1995  ACCT. NUMBER: [id number]  DATE OF SERVICE: 24  START TIME:  9:00 AM  END TIME: 11:00 AM  FACILITATOR(S): Bg Martin LADC  TOPIC: BEH Group Therapy  Number of patients attending the group:  8  Group Length:  2 Hours    Group Therapy Type: Recovery strategies and Emotion processing    Summary of Group / Topics Discussed:    Recovery Principles, Sober coping skills, Balanced lifestyle, Disease of addiction, Emotions/expression, and Relapse prevention      Group Attendance:  Attended group session    Patient's response to the group topic/interactions:  cooperative with task, discussed personal experience with topic, expressed readiness to alter behaviors, expressed understanding of topic, gave appropriate feedback to peers, and listened actively    Patient appeared to be Actively participating, Attentive, and Engaged.        Client specific details:  Sarah gave appropriate feedback. .

## 2024-02-13 NOTE — ED PROVIDER NOTES
[facility] EMERGENCY DEPARTMENT ([facility])  2/13/24  URVTB    History     Chief Complaint   Patient presents with    Dizziness     Weakness/fatigue     The history is provided by the patient and medical records. The history is limited by a language barrier. A  was used (Shira).     Sarah Keenan is a 28 year old male with a past medical history significant for substance abuse who presents to the Emergency Department for evaluation of dizziness and headache. Patient resides at [facility] for treatment from marijuana and crack use. Patient states earlier today he began feeling very dizzy and felt as if he lost strength in his arms and legs. Here in the ED he states he mostly has a headache. He has never felt something like this before. He denies changes in vision, speaking or swallowing. He denies recent head injury. Patient seems to stutter when he speaks however, states this is normal for him. He denies IV drug use.       Past Medical History  No past medical history on file.  No past surgical history on file.  amoxicillin (AMOXIL) 500 MG capsule  acetaminophen (TYLENOL) 325 MG tablet  alum & mag hydroxide-simethicone (MAALOX) 200-200-20 MG/5ML SUSP suspension  benzocaine-menthol (CEPACOL) 15-3.6 MG lozenge  guaiFENesin-dextromethorphan (ROBITUSSIN DM) 100-10 MG/5ML syrup  ibuprofen (ADVIL/MOTRIN) 200 MG tablet  loratadine (CLARITIN) 10 MG tablet  melatonin 3 MG tablet  senna-docusate (SENOKOT-S/PERICOLACE) 8.6-50 MG tablet      No Known Allergies  Family History  No family history on file.  Social History   Social History     Tobacco Use    Smoking status: Every Day     Packs/day: .5     Types: Cigarettes    Tobacco comments:     5-6 cigarettes per day     Vaping Use    Vaping Use: Never used   Substance Use Topics    Alcohol use: Not Currently    Drug use: Not Currently      Past medical history, past surgical history, medications, allergies, family history, and social history were  reviewed with the patient. No additional pertinent items.      A complete review of systems was performed with pertinent positives and negatives noted in the HPI, and all other systems negative.    Physical Exam   BP: 107/72  Pulse: 102  Temp: 97.4  F (36.3  C)  Resp: 17  SpO2: 94 %  Physical Exam  Vitals and nursing note reviewed.   Constitutional:       General: He is not in acute distress.     Appearance: He is well-developed. He is not ill-appearing, toxic-appearing or diaphoretic.   HENT:      Head: Normocephalic and atraumatic.      Mouth/Throat:      Lips: Pink.      Mouth: Mucous membranes are moist.      Pharynx: Oropharynx is clear. No oropharyngeal exudate.   Eyes:      General: Lids are normal. No visual field deficit or scleral icterus.     Extraocular Movements: Extraocular movements intact.      Right eye: No nystagmus.      Left eye: No nystagmus.      Conjunctiva/sclera: Conjunctivae normal.      Pupils: Pupils are equal, round, and reactive to light.   Neck:      Thyroid: No thyromegaly.      Vascular: No JVD.      Trachea: No tracheal deviation.   Cardiovascular:      Rate and Rhythm: Normal rate and regular rhythm.      Pulses: Normal pulses.      Heart sounds: Normal heart sounds. No murmur heard.     No friction rub. No gallop.   Pulmonary:      Effort: Pulmonary effort is normal. No respiratory distress.      Breath sounds: Normal breath sounds.   Abdominal:      General: Bowel sounds are normal. There is no distension.      Palpations: Abdomen is soft. There is no mass.      Tenderness: There is no abdominal tenderness. There is no guarding or rebound.   Musculoskeletal:         General: No tenderness. Normal range of motion.      Cervical back: Normal range of motion and neck supple. No erythema or rigidity.      Right lower leg: No edema.      Left lower leg: No edema.   Lymphadenopathy:      Cervical: No cervical adenopathy.   Skin:     General: Skin is warm and dry.      Capillary Refill:  Capillary refill takes less than 2 seconds.      Coloration: Skin is not pale.      Findings: No erythema or rash.   Neurological:      Mental Status: He is alert and oriented to person, place, and time.      Cranial Nerves: No cranial nerve deficit, dysarthria or facial asymmetry.      Sensory: No sensory deficit.      Motor: Motor function is intact.      Coordination: Finger-Nose-Finger Test and Heel to Shin Test normal. Rapid alternating movements normal.      Gait: Gait is intact.   Psychiatric:         Mood and Affect: Mood and affect normal.         Speech: Speech normal.         Behavior: Behavior normal.           ED Course, Procedures, & Data      Procedures                      Results for orders placed or performed during the hospital encounter of 02/13/24   CT Head w/o Contrast     Status: None    Narrative    EXAM: CT HEAD W/O CONTRAST, CTA HEAD NECK W CONTRAST  LOCATION: [facility]  DATE/TIME: 2/13/2024 5:25 PM CST    INDICATION: Headache, dizziness  COMPARISON: None.  CONTRAST: 67 mL Isovue 370  TECHNIQUE: Head and neck CT angiogram with IV contrast. Noncontrast head CT followed by axial helical CT images of the head and neck vessels obtained during the arterial phase of intravenous contrast administration. Axial 2D reconstructed images and   multiplanar 3D MIP reconstructed images of the head and neck vessels were performed by the technologist. Dose reduction techniques were used. All stenosis measurements made according to NASCET criteria unless otherwise specified.    FINDINGS:   NONCONTRAST HEAD CT:   INTRACRANIAL CONTENTS: No intracranial hemorrhage, extraaxial collection, or mass effect.  No CT evidence of acute infarct. Normal parenchymal attenuation. Normal ventricles and sulci.     VISUALIZED ORBITS/SINUSES/MASTOIDS: No intraorbital abnormality.  Bilateral maxillary sinus mucosal disease. No middle ear or mastoid effusion.    BONES/SOFT TISSUES:  No acute abnormality.    HEAD CTA:  ANTERIOR CIRCULATION: No stenosis/occlusion, aneurysm, or high flow vascular malformation. 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 Fetal origin of the right posterior cerebral artery from the anterior circulation.    POSTERIOR CIRCULATION: No stenosis/occlusion, aneurysm, or high flow vascular malformation. Balanced vertebral arteries supply a normal basilar artery.     DURAL VENOUS SINUSES: Expected enhancement of the major dural venous sinuses.    NECK CTA:  RIGHT CAROTID: No measurable stenosis or dissection.    LEFT CAROTID: No measurable stenosis or dissection.    VERTEBRAL ARTERIES: No focal stenosis or dissection. Balanced vertebral arteries.    AORTIC ARCH: Classic aortic arch anatomy with no significant stenosis at the origin of the great vessels.    NONVASCULAR STRUCTURES:  Small subpleural blebs in the lung apices.      Impression    IMPRESSION:   HEAD CT:  1.  No acute intracranial process.  2.  Bilateral maxillary sinus disease.    HEAD CTA:   1.  No significant stenosis, aneurysm, or high flow vascular malformation identified.  2.  Variant [place] of [place] anatomy as above.    NECK CTA:  1.  No large vessel occlusion or hemodynamically significant stenosis.   CTA Head Neck with Contrast     Status: None    Narrative    EXAM: CT HEAD W/O CONTRAST, CTA HEAD NECK W CONTRAST  LOCATION: [facility]  DATE/TIME: 2/13/2024 5:25 PM CST    INDICATION: Headache, dizziness  COMPARISON: None.  CONTRAST: 67 mL Isovue 370  TECHNIQUE: Head and neck CT angiogram with IV contrast. Noncontrast head CT followed by axial helical CT images of the head and neck vessels obtained during the arterial phase of intravenous contrast administration. Axial 2D reconstructed images and   multiplanar 3D MIP reconstructed images of the head and neck vessels were performed by the technologist. Dose reduction techniques were  used. All stenosis measurements made according to NASCET criteria unless otherwise specified.    FINDINGS:   NONCONTRAST HEAD CT:   INTRACRANIAL CONTENTS: No intracranial hemorrhage, extraaxial collection, or mass effect.  No CT evidence of acute infarct. Normal parenchymal attenuation. Normal ventricles and sulci.     VISUALIZED ORBITS/SINUSES/MASTOIDS: No intraorbital abnormality.  Bilateral maxillary sinus mucosal disease. No middle ear or mastoid effusion.    BONES/SOFT TISSUES: No acute abnormality.    HEAD CTA:  ANTERIOR CIRCULATION: No stenosis/occlusion, aneurysm, or high flow vascular malformation. 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 Fetal origin of the right posterior cerebral artery from the anterior circulation.    POSTERIOR CIRCULATION: No stenosis/occlusion, aneurysm, or high flow vascular malformation. Balanced vertebral arteries supply a normal basilar artery.     DURAL VENOUS SINUSES: Expected enhancement of the major dural venous sinuses.    NECK CTA:  RIGHT CAROTID: No measurable stenosis or dissection.    LEFT CAROTID: No measurable stenosis or dissection.    VERTEBRAL ARTERIES: No focal stenosis or dissection. Balanced vertebral arteries.    AORTIC ARCH: Classic aortic arch anatomy with no significant stenosis at the origin of the great vessels.    NONVASCULAR STRUCTURES:  Small subpleural blebs in the lung apices.      Impression    IMPRESSION:   HEAD CT:  1.  No acute intracranial process.  2.  Bilateral maxillary sinus disease.    HEAD CTA:   1.  No significant stenosis, aneurysm, or high flow vascular malformation identified.  2.  Variant [place] of [place] anatomy as above.    NECK CTA:  1.  No large vessel occlusion or hemodynamically significant stenosis.   [place] Draw     Status: None    Narrative    The following orders were created for panel order [place] Draw.  Procedure                               Abnormality         Status                      ---------                               -----------         ------                     Extra Blue Top Tube[[id number]]                              Final result               Extra Red Top Tube[[id number]]                               Final result               Extra Green Top (Lithium...[[id number]]                                                 Extra Purple Top Tube[[id number]]                                                         Please view results for these tests on the individual orders.   Extra Blue Top Tube     Status: None   Result Value Ref Range    Hold Specimen [facility]    Extra Red Top Tube     Status: None   Result Value Ref Range    Hold Specimen [facility]    Comprehensive metabolic panel     Status: Abnormal   Result Value Ref Range    Sodium 139 135 - 145 mmol/L    Potassium 4.5 3.4 - 5.3 mmol/L    Carbon Dioxide (CO2) 27 22 - 29 mmol/L    Anion Gap 12 7 - 15 mmol/L    Urea Nitrogen 15.5 6.0 - 20.0 mg/dL    Creatinine 0.76 0.67 - 1.17 mg/dL    GFR Estimate >90 >60 mL/min/1.73m2    Calcium 9.3 8.6 - 10.0 mg/dL    Chloride 100 98 - 107 mmol/L    Glucose 98 70 - 99 mg/dL    Alkaline Phosphatase 82 40 - 150 U/L    AST 62 (H) 0 - 45 U/L    ALT 79 (H) 0 - 70 U/L    Protein Total 7.7 6.4 - 8.3 g/dL    Albumin 4.3 3.5 - 5.2 g/dL    Bilirubin Total 0.2 <=1.2 mg/dL   Magnesium     Status: Normal   Result Value Ref Range    Magnesium 2.1 1.7 - 2.3 mg/dL   CBC with platelets and differential     Status: Abnormal   Result Value Ref Range    WBC Count 11.5 (H) 4.0 - 11.0 10e3/uL    RBC Count 5.15 4.40 - 5.90 10e6/uL    Hemoglobin 15.2 13.3 - 17.7 g/dL    Hematocrit 45.1 40.0 - 53.0 %    MCV 88 78 - 100 fL    MCH 29.5 26.5 - 33.0 pg    MCHC 33.7 31.5 - 36.5 g/dL    RDW 13.1 10.0 - 15.0 %    Platelet Count 337 150 - 450 10e3/uL    % Neutrophils 60 %    % Lymphocytes 24 %    % Monocytes 9 %    % Eosinophils 4 %    % Basophils 1 %    % Immature Granulocytes 2 %    NRBCs per 100 WBC 0 <1 /100    Absolute Neutrophils 7.1 1.6 -  8.3 10e3/uL    Absolute Lymphocytes 2.8 0.8 - 5.3 10e3/uL    Absolute Monocytes 1.0 0.0 - 1.3 10e3/uL    Absolute Eosinophils 0.4 0.0 - 0.7 10e3/uL    Absolute Basophils 0.1 0.0 - 0.2 10e3/uL    Absolute Immature Granulocytes 0.2 <=0.4 10e3/uL    Absolute NRBCs 0.0 10e3/uL   CBC with platelets differential     Status: Abnormal    Narrative    The following orders were created for panel order CBC with platelets differential.  Procedure                               Abnormality         Status                     ---------                               -----------         ------                     CBC with platelets and d...[[id number]]  Abnormal            Final result                 Please view results for these tests on the individual orders.     Medications   sodium chloride 0.9% BOLUS 1,000 mL (0 mLs Intravenous Stopped 2/13/24 1747)   iopamidol (ISOVUE-370) solution 100 mL (67 mLs Intravenous $Given 2/13/24 1706)   sodium chloride 0.9 % bag 100mL for CT scan flush use (80 mLs Intravenous $Given 2/13/24 1707)   amoxicillin (AMOXIL) capsule 500 mg (500 mg Oral $Given 2/13/24 2047)     Labs Ordered and Resulted from Time of ED Arrival to Time of ED Departure   COMPREHENSIVE METABOLIC PANEL - Abnormal       Result Value    Sodium 139      Potassium 4.5      Carbon Dioxide (CO2) 27      Anion Gap 12      Urea Nitrogen 15.5      Creatinine 0.76      GFR Estimate >90      Calcium 9.3      Chloride 100      Glucose 98      Alkaline Phosphatase 82      AST 62 (*)     ALT 79 (*)     Protein Total 7.7      Albumin 4.3      Bilirubin Total 0.2     CBC WITH PLATELETS AND DIFFERENTIAL - Abnormal    WBC Count 11.5 (*)     RBC Count 5.15      Hemoglobin 15.2      Hematocrit 45.1      MCV 88      MCH 29.5      MCHC 33.7      RDW 13.1      Platelet Count 337      % Neutrophils 60      % Lymphocytes 24      % Monocytes 9      % Eosinophils 4      % Basophils 1      % Immature Granulocytes 2      NRBCs per 100 WBC 0      Absolute  Neutrophils 7.1      Absolute Lymphocytes 2.8      Absolute Monocytes 1.0      Absolute Eosinophils 0.4      Absolute Basophils 0.1      Absolute Immature Granulocytes 0.2      Absolute NRBCs 0.0     MAGNESIUM - Normal    Magnesium 2.1       CTA Head Neck with Contrast   Final Result   IMPRESSION:    HEAD CT:   1.  No acute intracranial process.   2.  Bilateral maxillary sinus disease.      HEAD CTA:    1.  No significant stenosis, aneurysm, or high flow vascular malformation identified.   2.  Variant [place] of [place] anatomy as above.      NECK CTA:   1.  No large vessel occlusion or hemodynamically significant stenosis.      CT Head w/o Contrast   Final Result   IMPRESSION:    HEAD CT:   1.  No acute intracranial process.   2.  Bilateral maxillary sinus disease.      HEAD CTA:    1.  No significant stenosis, aneurysm, or high flow vascular malformation identified.   2.  Variant [place] of [place] anatomy as above.      NECK CTA:   1.  No large vessel occlusion or hemodynamically significant stenosis.                 Assessment & Plan      This patient presented to the emergency department with complaints of dizziness and headache.  All history was obtained through an  making a history somewhat difficult and exam was somewhat difficult also.  Patient's speech was noted to be somewhat halting and stuttering.  The  reported that this seemed abnormal but patient reports that this is baseline for him.  Given his past history of drug abuse as well as symptoms, I did obtain neuroimaging.  No evidence of vascular abnormality and no evidence of acute intracerebral pathology such as bleed or evidence of past stroke or mass.  There was evidence of sinus disease the patient does report headache is worse if he bends over and he has been having nasal congestion so I will treat as for a sinus infection.  He was told to return for any change or worsening and was discharged instructions for care and follow-up  in good condition.    I have reviewed the nursing notes. I have reviewed the findings, diagnosis, plan and need for follow up with the patient.    Discharge Medication List as of 2/13/2024  8:47 PM        START taking these medications    Details   amoxicillin (AMOXIL) 500 MG capsule Take 1 capsule (500 mg) by mouth 3 times daily for 10 days, Disp-30 capsule, R-0, E-Prescribe             Final diagnoses:   Sinusitis, unspecified chronicity, unspecified location   I, MONIK ROWE, am serving as a trained medical scribe to document services personally performed by Castillo Bravo MD, based on the provider's statements to me.      I, Castillo Bravo MD, was physically present and have reviewed and verified the accuracy of this note documented by MONIK ROWE.     Castillo Bravo MD  [facility] EMERGENCY DEPARTMENT  2/13/2024        Castillo Bravo MD  02/18/24 [id number]

## 2024-02-13 NOTE — ED TRIAGE NOTES
Through  patient states he is here because he is feeling dizzy, which began this afternoon. Also reports feeling weakness in hands and legs and low energy. Patient is currently residing at [facility].

## 2024-02-14 ENCOUNTER — HOSPITAL ENCOUNTER (OUTPATIENT)
Dept: BEHAVIORAL HEALTH | Facility: CLINIC | Age: 29
Discharge: HOME | End: 2024-02-14
Attending: FAMILY MEDICINE
Payer: COMMERCIAL

## 2024-02-14 PROCEDURE — H2035 A/D TX PROGRAM, PER HOUR: HCPCS | Mod: HQ

## 2024-02-14 PROCEDURE — 1002N00001 HC LODGING PLUS FACILITY CHARGE ADULT

## 2024-02-14 NOTE — GROUP NOTE
Group Therapy Documentation    PATIENT'S NAME: Sarah Keenan  MRN:   [id number]  :   1995  ACCT. NUMBER: [id number]  DATE OF SERVICE: 24  START TIME:  8:30 AM  END TIME:  9:30 AM  FACILITATOR(S): Edna Plasencia LADC; Linus Marcelino LADC  TOPIC: BEH Group Therapy  Number of patients attending the group:  21  Group Length:  1 Hours    Group Therapy Type: Emotion processing    Summary of Group / Topics Discussed:    Co-occurring illnesses symptom management and Emotions/expression      Group Attendance:  Attended group session    Patient's response to the group topic/interactions:  cooperative with task    Patient appeared to be Attentive and Engaged.        Client specific details: Sarah was an active participant in lecture on stress, anxiety, and sober coping skills.

## 2024-02-14 NOTE — PROGRESS NOTES
[facility] Weekly Treatment Plan Review    Treatment plan review for the following date span:  2/9/24 to 2/14/24    ATTENDANCE  Patient did have any absences during this time period (list absence dates and reason for absence).  Patient was in the ED during afternoon group on 2/13       Weekly Treatment Plan Review     Treatment Plan initiated on: 2/9/24.    Dimension1: Acute Intoxication/Withdrawal Potential -   Client Goals Addressed Since last Review: Develop effective strategies to maintain sobriety.   Are Treatment Plan goals/methods effective? Yes  Date of Last Use 2/7/24  Any reports of withdrawal symptoms - No      Dimension 2: Biomedical Conditions & Complications -   Client Goals Addressed Since last Review: Obtain a medical evaluation. and Follow recommendations of medical provider.   Are Treatment Plan goals/methods effective? Yes  Medical Concerns: Patient was seen in the ED for dizziness/fatigue  Vitals:   BP Readings from Last 3 Encounters:   02/13/24 118/68   02/13/24 110/80   02/09/24 111/81     Pulse Readings from Last 3 Encounters:   02/13/24 96   02/13/24 100   02/09/24 89     Wt Readings from Last 3 Encounters:   No data found for Wt     Temp Readings from Last 3 Encounters:   02/13/24 97.4  F (36.3  C) (Oral)   02/09/24 97.5  F (36.4  C)      Current Medications & Medication Changes:  Current Outpatient Medications   Medication    acetaminophen (TYLENOL) 325 MG tablet    alum & mag hydroxide-simethicone (MAALOX) 200-200-20 MG/5ML SUSP suspension    amoxicillin (AMOXIL) 500 MG capsule    benzocaine-menthol (CEPACOL) 15-3.6 MG lozenge    guaiFENesin-dextromethorphan (ROBITUSSIN DM) 100-10 MG/5ML syrup    ibuprofen (ADVIL/MOTRIN) 200 MG tablet    loratadine (CLARITIN) 10 MG tablet    melatonin 3 MG tablet    senna-docusate (SENOKOT-S/PERICOLACE) 8.6-50 MG tablet     No current facility-administered medications for this encounter.     Facility-Administered Medications Ordered in Other  "Encounters   Medication    Self Administer Medications: Behavioral Services     Taking meds as prescribed? Yes  Medication side effects or concerns:  None  Outside medical appointments since last review (list provider and reason for visit):  Patient was seen in the ED on 2/13      Dimension 3: Emotional/Behavioral Conditions & Complications -   Client Goals Addressed Since last Review: Learn skills to express emotions in a healthy and appropriate way   Are Treatment Plan goals/methods effective? Yes  Mental health diagnosis: None  PHQ2:       2/14/2024    12:00 PM   PHQ-2 ( 1999 [employer])   Q1: Little interest or pleasure in doing things 0   Q2: Feeling down, depressed or hopeless 0   PHQ-2 Score 0      GAD2:       2/14/2024    12:00 PM   ED-2   Feeling nervous, anxious, or on edge 0   Not being able to stop or control worrying 0   ED-2 Total Score 0     Date of last SIB:  NA  Date of  last SI:  NA  Date of last HI: NA  Behavioral Targets: Stabilize and maintain mental health.  Current MH Assignments:  None    Additional Narrative:  Current Mental Health symptoms include: none identified.  Active interventions to stabilize mental health symptoms this week : none. Patient denies any suicidal thoughts or ideations at this time. Patient will continue to be monitored throughout treatment.       Dimension 4: Treatment Acceptance / Resistance -   Client Goals Addressed Since last Review: Understand the impact your substance use has had on you.   Are Treatment Plan goals/methods effective? Yes  MARLY Diagnosis:  304.30 (F12.20) Cannabis Use Disorder Severe  In a controlled environment  Stimulant Use Disorder:  In a controlled environment, Specify current severity:  Severe  304.40 (F15.20) Severe, Amphetamine type substance  Commitment to tx process/Stage of change- Contemplation  MARLY assignments - \"How Substance Use Affects my Behavior\"      Additional Narrative - Patient verbally reports being motivated for long-term " recovery. Patient's group attendance and participation have been positive overall.    Dimension 5: Relapse / Continued Problem Potential -   Client Goals Addressed Since last Review: Identify personal triggers and relapse warning signs.   Are Treatment Plan goals/methods effective? Yes  Relapses this week - None  Urges to use - None  UA results -   Recent Results (from the past 672 hour(s))   FENTANYL, QUALITATIVE, WITH REFLEX TO QUANT URINE    Collection Time: 02/09/24  9:54 AM   Result Value Ref Range    Fentanyl Qual Urine Screen Negative Screen Negative   Extra Blue Top Tube    Collection Time: 02/13/24  3:58 PM   Result Value Ref Range    Hold Specimen JIC    Extra Red Top Tube    Collection Time: 02/13/24  3:58 PM   Result Value Ref Range    Hold Specimen JIC    Comprehensive metabolic panel    Collection Time: 02/13/24  3:58 PM   Result Value Ref Range    Sodium 139 135 - 145 mmol/L    Potassium 4.5 3.4 - 5.3 mmol/L    Carbon Dioxide (CO2) 27 22 - 29 mmol/L    Anion Gap 12 7 - 15 mmol/L    Urea Nitrogen 15.5 6.0 - 20.0 mg/dL    Creatinine 0.76 0.67 - 1.17 mg/dL    GFR Estimate >90 >60 mL/min/1.73m2    Calcium 9.3 8.6 - 10.0 mg/dL    Chloride 100 98 - 107 mmol/L    Glucose 98 70 - 99 mg/dL    Alkaline Phosphatase 82 40 - 150 U/L    AST 62 (H) 0 - 45 U/L    ALT 79 (H) 0 - 70 U/L    Protein Total 7.7 6.4 - 8.3 g/dL    Albumin 4.3 3.5 - 5.2 g/dL    Bilirubin Total 0.2 <=1.2 mg/dL   Magnesium    Collection Time: 02/13/24  3:58 PM   Result Value Ref Range    Magnesium 2.1 1.7 - 2.3 mg/dL   CBC with platelets and differential    Collection Time: 02/13/24  3:58 PM   Result Value Ref Range    WBC Count 11.5 (H) 4.0 - 11.0 10e3/uL    RBC Count 5.15 4.40 - 5.90 10e6/uL    Hemoglobin 15.2 13.3 - 17.7 g/dL    Hematocrit 45.1 40.0 - 53.0 %    MCV 88 78 - 100 fL    MCH 29.5 26.5 - 33.0 pg    MCHC 33.7 31.5 - 36.5 g/dL    RDW 13.1 10.0 - 15.0 %    Platelet Count 337 150 - 450 10e3/uL    % Neutrophils 60 %    % Lymphocytes 24  %    % Monocytes 9 %    % Eosinophils 4 %    % Basophils 1 %    % Immature Granulocytes 2 %    NRBCs per 100 WBC 0 <1 /100    Absolute Neutrophils 7.1 1.6 - 8.3 10e3/uL    Absolute Lymphocytes 2.8 0.8 - 5.3 10e3/uL    Absolute Monocytes 1.0 0.0 - 1.3 10e3/uL    Absolute Eosinophils 0.4 0.0 - 0.7 10e3/uL    Absolute Basophils 0.1 0.0 - 0.2 10e3/uL    Absolute Immature Granulocytes 0.2 <=0.4 10e3/uL    Absolute NRBCs 0.0 10e3/uL     Identified triggers - None identified  Coping skills identified - none identified.  Patient is able to utilize these skills when needed.    Additional Narrative- Patient currently has minimal insight into his personal relapse process. He continues to work on developing relapse prevention strategies and sober coping skills.    Dimension 6: Recovery Environment -   Client Goals Addressed Since last Review: Comply with recommendations of probation.   Are Treatment Plan goals/methods effective? Yes  Family Involvement - None  Summarize participation in family sessions - None  Family supportive of program/stages?  Yes      Community support group attendance -  Patient is attending nightly sober support meetings/lectures.  Recreational activities - Patient has been participating in offered program activities and leisure time with peers.      Additional Narrative - Patient has been spending time with same gender-peers and connecting with/building a sober support network. Patient's aftercare plan may include returning to [place] for outpatient programming.    Progress made on transition planning goals: None at this time.    Justification for Continued Treatment at this Level of Care:  Patient continues to work on completing treatment plan goals and interventions.   Patient remains a high risk for relapse at this time and would benefit from continued support in developing relapse prevention strategies.   Patient's mental health symptoms are currently impacting his daily functioning and would benefit  from continued support.  Treatment coordination activities since last review:   None  Need for peer recovery support referral? No    Discharge Planning:  Target Discharge Date/Timeframe:  3/8/24  Med Mgmt Provider/Appt:  LISA  Ind therapy Provider/Appt:  LISA  Family therapy Provider/Appt:  LISA          Dimension Scale Review     Prior ratings: Dim1 - 0 DIM2 - 0 DIM3 - 1 DIM4 - 3 DIM5 - 4 DIM6 -3     Current ratings: Dim1 - 0 DIM2 - 0 DIM3 - 1 DIM4 - 3 DIM5 - 4 DIM6 -3     Is patient a vulnerable adult?  Yes - reviewed and evaluated IAPP? Yes        *Client received copy of changes: N/A  *Client is aware of right to access a treatment plan review: Yes

## 2024-02-14 NOTE — GROUP NOTE
Group Therapy Documentation    PATIENT'S NAME: Sarah Keenan  MRN:   [id number]  :   1995  ACCT. NUMBER: [id number]  DATE OF SERVICE: 24  START TIME: 12:30 PM  END TIME:  2:30 PM  FACILITATOR(S): Donald Schwartz LADC  TOPIC: BEH Group Therapy  Number of patients attending the group:  7  Group Length:  2 Hours    Group Therapy Type: Recovery strategies    Summary of Group / Topics Discussed:    Recovery Principles, Mindfulness/Relaxation, Coping/DBT informed care, Trauma informed care, Disease of addiction, and Emotions/expression      Group Attendance:  Attended group session    Patient's response to the group topic/interactions:  cooperative with task    Patient appeared to be Attentive and Engaged.        Client specific details:  Sarah participated in afternoon group. He took part in a mindfulness activity and discussion. For the second half of group he listened to and gave positive feedback on his peer's assignment.

## 2024-02-14 NOTE — GROUP NOTE
Group Therapy Documentation    PATIENT'S NAME: Sarah Keenan  MRN:   [id number]  :   1995  ACCT. NUMBER: [id number]  DATE OF SERVICE: 24  START TIME:  9:30 AM  END TIME: 11:15 AM  FACILITATOR(S): Edna Plasencia LADC  TOPIC: BEH Group Therapy  Number of patients attending the group:  7  Group Length:  2 Hours    Group Therapy Type: Emotion processing    Summary of Group / Topics Discussed:    Disease of addiction and Relapse prevention      Group Attendance:  Attended group session    Patient's response to the group topic/interactions:  cooperative with task    Patient appeared to be Actively participating, Attentive, and Engaged.        Client specific details: Sarah participated in 2 peer graduation ceremonies and offered feedback to his peer who shared his relapse prevention plan.

## 2024-02-15 ENCOUNTER — HOSPITAL ENCOUNTER (OUTPATIENT)
Dept: BEHAVIORAL HEALTH | Facility: CLINIC | Age: 29
Discharge: HOME | End: 2024-02-15
Attending: FAMILY MEDICINE
Payer: COMMERCIAL

## 2024-02-15 ENCOUNTER — OFFICE VISIT (OUTPATIENT)
Dept: ADDICTION MEDICINE | Facility: CLINIC | Age: 29
End: 2024-02-15
Payer: COMMERCIAL

## 2024-02-15 VITALS — SYSTOLIC BLOOD PRESSURE: 116 MMHG | DIASTOLIC BLOOD PRESSURE: 81 MMHG | HEART RATE: 84 BPM

## 2024-02-15 DIAGNOSIS — F15.20 METHAMPHETAMINE USE DISORDER, SEVERE (H): Primary | ICD-10-CM

## 2024-02-15 LAB
AMPHETAMINE QUAL URINE POCT: NEGATIVE
BARBITURATE QUAL URINE POCT: NEGATIVE
BENZODIAZEPINE QUAL URINE POCT: NEGATIVE
BUPRENORPHINE QUAL URINE POCT: NEGATIVE
COCAINE QUAL URINE POCT: NEGATIVE
CREATININE QUAL URINE POCT: NORMAL
INTERNAL QC QUAL URINE POCT: NORMAL
MDMA QUAL URINE POCT: NEGATIVE
METHADONE QUAL URINE POCT: NEGATIVE
METHAMPHETAMINE QUAL URINE POCT: NEGATIVE
OPIATE QUAL URINE POCT: NEGATIVE
OXYCODONE QUAL URINE POCT: NEGATIVE
PH QUAL URINE POCT: NORMAL
PHENCYCLIDINE QUAL URINE POCT: NEGATIVE
POCT KIT EXPIRATION DATE: NORMAL
POCT KIT LOT NUMBER: NORMAL
SPECIFIC GRAVITY POCT: 1.01
TEMPERATURE URINE POCT: NORMAL
THC QUAL URINE POCT: NEGATIVE

## 2024-02-15 PROCEDURE — H2035 A/D TX PROGRAM, PER HOUR: HCPCS | Mod: HQ

## 2024-02-15 PROCEDURE — 1002N00001 HC LODGING PLUS FACILITY CHARGE ADULT

## 2024-02-15 PROCEDURE — 99204 OFFICE O/P NEW MOD 45 MIN: CPT | Performed by: NURSE PRACTITIONER

## 2024-02-15 PROCEDURE — 80305 DRUG TEST PRSMV DIR OPT OBS: CPT | Performed by: NURSE PRACTITIONER

## 2024-02-15 NOTE — PROGRESS NOTES
"    SUBJECTIVE                                                      Sarah Keenan is a 28 year old male who presents for  initial visit for addiction consultation and management referred by [facility] inpatient program due to concerns for Stimulant Use Disorder.     Visit performed In Person, face-to-face    HPI:   Sarah Keenan is a 28 year old male with history of methamphetamine and cannabis use who presents for further evaluation of substance use disorder and management options. Visit was conducted with in person  services.     Patient is currently at [facility] for inpatient programming. Reports he is there to \"get sober and work on addictions\" Reports he has been struggling with methamphetamine use. Has been using this since 2020, about 4 years. Use has been intermittent during the past 4 years. Longest sober periods were during incarceration. 6 months and 9 months. Most recent incarceration was in 2023. Last methamphetamine use was 2/7/24. Denies current cravings. Reports meth use has been triggered by \"family conflicts\" and \"anxious thoughts and anxiety\" Family conflicts are primarily regarding communication difficulties with wife, they are now . He has 3 children ages 12, 9 yo and 4 yo. Children live with mother, no substance use concerns. He was in outpatient programming in [place]. Was referred to  for high level of care. Reports he was trying not to use, but was not in a sober environment, environmental triggers. He believes he is not addicted to methamphetamine, reports he never wants to use if he is alone. Plans to resume outpatient programming after [facility]. He is on probation and required to completed programming and regular UDS. He is not interested in medication for MARLY or for mood. He is sleeping well. Appetite WDL.     Has been in the [place] for 7-8 years. He relocated here in 2017 [place] [facility] 2[place] He family fled from war in [place] when he was a child. Living " situation here is a lot better here. 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       DSM criteria met:   Substance is often taken in larger amounts or over a longer period than was intended. - YES   There is a persistent desire or unsuccessful efforts to cut down or control  use. - YES   A great deal of time is spent in activities necessary to obtain , use , or recover from its effects. - YES   Craving, or a strong desire or urge to use. - NO  Recurrent use resulting in a failure to fulfill major role obligations at work, school, or home. - YES   Continued  use despite having persistent or recurrent social or interpersonal problems caused or exacerbated by the effects of the substance.- YES  Important social, occupational, or recreational activities are given up or reduced because of use. - YES  Recurrent  use in situations in which it is physically hazardous. - YES  Substance use is continued despite knowledge of having a persistent or recurrent physical or psychological problem that is likely to have been caused or exacerbated by it. - YES  Tolerance- no   Withdrawal - no    303.9 (F10.2) Severe: Presence of 6 or more symptoms.     Substance Use History:   Most recent use, past or recent hx of harmful use  ALCOHOL - Denies   CANNABIS - Yes, rarely, only when available, was using more often he was living in refugee camp [facility].   PRESCRIPTION STIMULANTS - denies   COCAINE/CRACK - denies   METH/AMPHETAMINES - yes, via smoking, daily, last use 2/10/24   OPIATES - denies  BENZODIAZEPINES - denies   KRATOM - denies   KETAMINE - denies   HALLUCINOGENS (including DXM) - denies   BEHAVIORAL (Gambling, Eating d/o, Compulsivity) - denies  NICOTINE -   Cigarettes: yes, 10-11 cigarettes per day, not interested in quitting at this time.       Previous  "withdrawal treatment episodes (e.g. detox): denies   Previous MARLY treatment programs: outpatient   Hospitalizations or overdose: denies   Medical complications from substance use: denies   IV Drug use?: denies   Previous Medication for Addiction Tx: denies   Longest period of full abstinence: 9 months   Activities that have previously supported abstinence: incarceration   Current Recovery Activities: [facility]       Infectious disease screening  Hep C: negative per pt    HIV: negative per pt  No results found for: \"HIV\"     Psychiatric History (per patient report and problem list review)  Past diagnoses - some symptoms of depression and anxiety with no formal diagnosis. Reports no current symptoms   Current or past psychiatrist: no  Current or past therapist:  yes, [facility]   Hospitalizations/TMS/ECT - denies   Suicide Attempts - denies  Medication trials - denies       PHQ-9 scores:      2/9/2024    10:00 AM   PHQ   PHQ-9 Total Score 0   Q9: Thoughts of better off dead/self-harm past 2 weeks Not at all     ED-7 scores:      2/9/2024    10:00 AM   ED-7 SCORE   Total Score 0       SOCIAL HISTORY:  Housing status:  [employer]   Employment status: Unemployed, seeking work  Relationship status:   Children: 3  Legal concerns related to use: probation   Contact information up to date? Yes     3rd Party Involvement - not at this time (please obtain CLAUDIA if pt would like to include)      Medical History:    Patient Active Problem List    Diagnosis Date Noted    Chemical dependency (H) 02/09/2024     Priority: Medium       No past medical history on file.    No past surgical history on file.      No family history on file.      Current Outpatient Medications   Medication Sig Dispense Refill    acetaminophen (TYLENOL) 325 MG tablet Take 325-650 mg by mouth every 4 hours as needed for mild pain (Patient not taking: Reported on 2/15/2024)      alum & mag hydroxide-simethicone (MAALOX) 200-200-20 MG/5ML SUSP suspension Take 30 " mLs by mouth every 6 hours as needed for indigestion (Patient not taking: Reported on 2/15/2024)      amoxicillin (AMOXIL) 500 MG capsule Take 1 capsule (500 mg) by mouth 3 times daily for 10 days (Patient not taking: Reported on 2/15/2024) 30 capsule 0    benzocaine-menthol (CEPACOL) 15-3.6 MG lozenge Place 1 lozenge inside cheek every 2 hours as needed for sore throat (Patient not taking: Reported on 2/15/2024)      guaiFENesin-dextromethorphan (ROBITUSSIN DM) 100-10 MG/5ML syrup Take 10 mLs by mouth every 4 hours as needed for cough (Patient not taking: Reported on 2/15/2024)      ibuprofen (ADVIL/MOTRIN) 200 MG tablet Take 400 mg by mouth every 6 hours as needed for pain (Patient not taking: Reported on 2/15/2024)      loratadine (CLARITIN) 10 MG tablet Take 10 mg by mouth daily as needed for allergies (Patient not taking: Reported on 2/15/2024)      melatonin 3 MG tablet Take 3 mg by mouth nightly as needed for sleep (Patient not taking: Reported on 2/15/2024)      senna-docusate (SENOKOT-S/PERICOLACE) 8.6-50 MG tablet Take 2 tablets by mouth daily as needed for constipation (Patient not taking: Reported on 2/15/2024)           No Known Allergies        OBJECTIVE                                                      EXAM    /81 (BP Location: Right arm, Patient Position: Sitting, Cuff Size: Adult Regular)   Pulse 84     GENERAL: healthy, alert and no distress  EYES: Eyes grossly normal to inspection, PERRL and conjunctivae and sclerae normal  RESP: No respiratory distress  MENTAL STATUS EXAM  Appearance/Behavior: No appearant distress and Neatly groomed  Speech: Normal  Mood/Affect: normal affect  Insight: Adequate    LAB  Recent UDS Labs (may not contain today's lab data)  Lab Results   Component Value Date    BUP Negative 02/15/2024    BZO Negative 02/15/2024    BAR Negative 02/15/2024    ANIA Negative 02/15/2024    MAMP Negative 02/15/2024    AMP Negative 02/15/2024    MDMA Negative 02/15/2024    MTD  Negative 02/15/2024    XIH206 Negative 02/15/2024    OXY Negative 02/15/2024    PCP Negative 02/15/2024    THC Negative 02/15/2024    TEMP 92 F 02/15/2024    SGPOCT 1.015 02/15/2024       Hepatic Function  AST   Date Value Ref Range Status   02/13/2024 62 (H) 0 - 45 U/L Final     Comment:     Reference intervals for this test were updated on 6/12/2023 to more accurately reflect our healthy population. There may be differences in the flagging of prior results with similar values performed with this method. Interpretation of those prior results can be made in the context of the updated reference intervals.     ALT   Date Value Ref Range Status   02/13/2024 79 (H) 0 - 70 U/L Final     Comment:     Reference intervals for this test were updated on 6/12/2023 to more accurately reflect our healthy population. There may be differences in the flagging of prior results with similar values performed with this method. Interpretation of those prior results can be made in the context of the updated reference intervals.       Bilirubin Total   Date Value Ref Range Status   02/13/2024 0.2 <=1.2 mg/dL Final     Albumin   Date Value Ref Range Status   02/13/2024 4.3 3.5 - 5.2 g/dL Final       CBC  WBC Count   Date Value Ref Range Status   02/13/2024 11.5 (H) 4.0 - 11.0 10e3/uL Final     RBC Count   Date Value Ref Range Status   02/13/2024 5.15 4.40 - 5.90 10e6/uL Final     Hemoglobin   Date Value Ref Range Status   02/13/2024 15.2 13.3 - 17.7 g/dL Final     Hematocrit   Date Value Ref Range Status   02/13/2024 45.1 40.0 - 53.0 % Final     MCV   Date Value Ref Range Status   02/13/2024 88 78 - 100 fL Final     MCH   Date Value Ref Range Status   02/13/2024 29.5 26.5 - 33.0 pg Final     MCHC   Date Value Ref Range Status   02/13/2024 33.7 31.5 - 36.5 g/dL Final     Platelet Count   Date Value Ref Range Status   02/13/2024 337 150 - 450 10e3/uL Final     RDW   Date Value Ref Range Status   02/13/2024 13.1 10.0 - 15.0 % Final        Today's lab data  Results for orders placed or performed in visit on 02/15/24   Drugs of Abuse Screen Urine (POC CUPS) POCT     Status: Normal   Result Value Ref Range    POCT Kit Lot Number [id number]     POCT Kit Expiration Date 2025-08-22     Temperature Urine POCT 92 F 90 F, 92 F, 94 F, 96 F, 98 F, 100 F    Specific [place] POCT 1.015 1.005, 1.015, 1.025    pH Qual Urine POCT 7 pH 4 pH, 5 pH, 7 pH, 9 pH    Creatinine Qual Urine POCT 20 mg/dL 20 mg/dL, 50 mg/dL, 100 mg/dL, 200 mg/dL    Internal QC Qual Urine POCT Valid Valid    Amphetamine Qual Urine POCT Negative Negative    Barbiturate Qual Urine POCT Negative Negative    Buprenorphine Qual Urine POCT Negative Negative    Benzodiazepine Qual Urine POCT Negative Negative    Cocaine Qual Urine POCT Negative Negative    Methamphetamine Qual Urine POCT Negative Negative    MDMA Qual Urine POCT Negative Negative    Methadone Qual Urine POCT Negative Negative    Opiate Qual Urine POCT Negative Negative    Oxycodone Qual Urine POCT Negative Negative    Phencyclidine Qual Urine POCT Negative Negative    THC Qual Urine POCT Negative Negative             [place] Board of Pharmacy Data Base Reviewed; Consistent with patient reports and Epic records.           A/P                                                      ASSESSMENT/PLAN:  Diagnoses and all orders for this visit:    Methamphetamine use disorder, severe (H)  -     Drugs of Abuse Screen Urine (POC CUPS) POCT; Standing  -     Drugs of Abuse Screen Urine (POC CUPS) POCT    - Criteria met for methamphetamine use disorder severe.   - continue programming at [facility]   - discussed triggers and pt counseling completed as below.   - pt is not interested in pharmacotherapy for methamphetamine use disorder at this time. Could consider Naltrexone (Vivitrol) + Wellbutrin. Alternatively could consider Mirtazapine.    - H/o mixed anxiety and depressive symptoms, reports no current symptoms. H/o traumatic experiences  including fleeing from war, living as a refugee in [place], immig[place] to [place].  He is incredible resilient and maintains a very positive outlook.    - pt has not received amoxicillin, was ordered per ED for 500 mg TID x 10 days. Prescription sent to [facility], was not delivered to [facility]. Recommended he fill this prescription and take entire course. Care coordination with [facility] today.   - declines infectious disease screening today     Counseled the patient on the importance of having a recovery program in addition to medication to manage recovery.  Components include avoiding isolating, having willingness to change, avoiding triggers and managing cravings. Encouraged having some type of sober network and practicing honesty with trusted support person(s). Encouraged other services such as counseling, 12 step or other self-help organizations.      RTC   Welcome to follow up at anytime as needed.     I sent a total of 50 minutes today, on the care of this patient. This consisted of face-to-face time as well as time spent on pre-visit and post-visit activities including coordination of care, chart review, results review, and documentation. Bere Escamilla CNP.       ABIMBOLA Newton [facility]  [phone]

## 2024-02-15 NOTE — Clinical Note
Pt was prescribed amoxicillin 500 mg tid x 10 days in ED. Reports he does not have rx. He should complete the course of this ABX. Please call pharmacy if needed to deliver. Thank you - ABIMBOLA Newton CNP on 2/15/2024 at 4:46 PM

## 2024-02-15 NOTE — GROUP NOTE
Psychoeducation Group Documentation    PATIENT'S NAME: Sarah Keenan  MRN:   [id number]  :   1995  ACCT. NUMBER: [id number]  DATE OF SERVICE: 2/15/24  START TIME:  3:00 PM  END TIME:  4:00 PM  FACILITATOR(S): Tobi Goldstein LADC; Shirley Walker LADC; John Prado LADC  TOPIC: BEH Pyschoeducation  Number of patients attending the group: 6  Group Length:  1 Hours    Skills Group Therapy Type: Emotion regulation skills and Healthy behaviors development    Summary of Group / Topics Discussed:      Skills group was presented by: Dr. Robles  on  Dual Diagnosis . The patient's were engaged in Q&A  during his presentation and resources were provided for future assistance that would be beneficial and can assist in abstinence, goals, and support recovery.         Group Attendance:  Attended group session    Patient's response to the group topic/interactions:  cooperative with task    Patient appeared to be Engaged.         Client specific details:  Sarah, participated in skills group.

## 2024-02-15 NOTE — GROUP NOTE
Group Therapy Documentation    PATIENT'S NAME: Sarah Keenan  MRN:   [id number]  :   1995  ACCT. NUMBER: [id number]  DATE OF SERVICE: 2/15/24  START TIME:  9:00 AM  END TIME: 11:00 AM  FACILITATOR(S): Edna Plasencia LADC  TOPIC: BEH Group Therapy  Number of patients attending the group:  6  Group Length:  2 Hours    Group Therapy Type: Emotion processing    Summary of Group / Topics Discussed:    Disease of addiction and Emotions/expression      Group Attendance:  Attended group session    Patient's response to the group topic/interactions:  cooperative with task    Patient appeared to be Actively participating, Attentive, and Engaged.        Client specific details: Sarah was an active participant in morning group therapy session. He participated in a peer graduation ceremony and shared his drug use history.

## 2024-02-15 NOTE — GROUP NOTE
Group Therapy Documentation    PATIENT'S NAME: Sarah Keenan  MRN:   [id number]  :   1995  ACCT. NUMBER: [id number]  DATE OF SERVICE: 2/15/24  START TIME: 12:30 PM  END TIME:  2:30 PM  FACILITATOR(S): Edna Plasencia LADC; Earlene Quiñones  TOPIC: BEH Group Therapy  Number of patients attending the group:  7  Group Length:  2 Hours    Group Therapy Type: Emotion processing    Summary of Group / Topics Discussed:    Spiritual Care      Group Attendance:  Attended group session    Patient's response to the group topic/interactions:  cooperative with task    Patient appeared to be Actively participating, Attentive, and Engaged.        Client specific details: Sarah was an active participant in spiritual care group session led by Earlene Quiñones.

## 2024-02-16 ENCOUNTER — HOSPITAL ENCOUNTER (OUTPATIENT)
Dept: BEHAVIORAL HEALTH | Facility: CLINIC | Age: 29
Discharge: HOME | End: 2024-02-16
Attending: FAMILY MEDICINE
Payer: COMMERCIAL

## 2024-02-16 PROCEDURE — H2035 A/D TX PROGRAM, PER HOUR: HCPCS | Mod: HQ

## 2024-02-16 PROCEDURE — 1002N00001 HC LODGING PLUS FACILITY CHARGE ADULT

## 2024-02-16 NOTE — GROUP NOTE
Group Therapy Documentation    PATIENT'S NAME: Sarah Keenan  MRN:   [id number]  :   1995  ACCT. NUMBER: [id number]  DATE OF SERVICE: 24  START TIME: 12:30 PM  END TIME:  2:30 PM  FACILITATOR(S): Mariola Starks LADC; Edna Plasencia LADC; Nafisa Youngblood LADC  TOPIC: BEH Group Therapy  Number of patients attending the group:  27  Group Length:  2 Hours    Group Therapy Type: Emotion processing    Summary of Group / Topics Discussed:    Recovery Principles, Relationship/socialization, and Emotions/expression    Group Attendance:  Attended group session    Patient's response to the group topic/interactions:  cooperative with task    Patient appeared to be Attentive and Engaged.        Client specific details: Pt watched a recovery-related film with peers and participated in subsequent discussion.

## 2024-02-16 NOTE — GROUP NOTE
"Group Therapy Documentation    PATIENT'S NAME: Sarah Keenan  MRN:   [id number]  :   1995  ACCT. NUMBER: [id number]  DATE OF SERVICE: 24  START TIME:  9:00 AM  END TIME: 11:00 AM  FACILITATOR(S): Nafisa Youngblood LADC  TOPIC: BEH Group Therapy  Number of patients attending the group:  6  Group Length:  2 Hours    Group Therapy Type: Recovery strategies and Emotion processing    Summary of Group / Topics Discussed:    Sober coping skills, Cognitive behavioral therapy skills, and Emotions/expression    Group Attendance:  Attended group session    Patient's response to the group topic/interactions:  cooperative with task    Patient appeared to be Actively participating, Attentive, and Engaged.        Client specific details: Pt reported feeling happy and shared that he is looking forward to getting back into playing soccer. He offered his peers feedback on their \"First Step\" and \"Anxiety\" assignments, and took part in a group discussion of common cognitive distortions and how to challenge them.  "

## 2024-02-17 ENCOUNTER — HOSPITAL ENCOUNTER (OUTPATIENT)
Dept: BEHAVIORAL HEALTH | Facility: CLINIC | Age: 29
Discharge: HOME | End: 2024-02-17
Attending: FAMILY MEDICINE
Payer: COMMERCIAL

## 2024-02-17 PROCEDURE — 1002N00001 HC LODGING PLUS FACILITY CHARGE ADULT

## 2024-02-17 PROCEDURE — H2035 A/D TX PROGRAM, PER HOUR: HCPCS | Mod: HQ

## 2024-02-17 NOTE — GROUP NOTE
Psychoeducation Group Documentation    PATIENT'S NAME: Sarah Keenan  MRN:   [id number]  :   1995  ACCT. NUMBER: [id number]  DATE OF SERVICE: 24  START TIME: 12:30 PM  END TIME:  2:40 PM  FACILITATOR(S): Anmol Caraballo LADC  TOPIC: BEH Pyschoeducation  Number of patients attending the group:  25  Group Length:  2 Hours    Skills Group Therapy Type: Recovery skills and Relationship skills development    Summary of Group / Topics Discussed:    Relationship/social skills and Balanced lifestyle skills        Group Attendance:  Attended group session    Patient's response to the group topic/interactions:  listened actively    Patient appeared to be Attentive and Engaged.         Client specific details:   Pt was attentive and engaged.

## 2024-02-17 NOTE — GROUP NOTE
Group Therapy Documentation    PATIENT'S NAME: Sarah Keenan  MRN:   [id number]  :   1995  ACCT. NUMBER: [id number]  DATE OF SERVICE: 24  START TIME:  9:00 AM  END TIME: 11:00 AM  FACILITATOR(S): Donald Schwartz LADC  TOPIC: BEH Group Therapy  Number of patients attending the group:  26  Group Length:  2 Hours    Group Therapy Type: Recovery strategies    Summary of Group / Topics Discussed:    Recovery Principles, Mindfulness/Relaxation, Coping/DBT informed care, Trauma informed care, Disease of addiction, and Emotions/expression      Group Attendance:  Attended group session    Patient's response to the group topic/interactions:  cooperative with task    Patient appeared to be Attentive and Engaged.        Client specific details:  The patient participated in the morning lecture on recovery resources.

## 2024-02-18 ENCOUNTER — HOSPITAL ENCOUNTER (OUTPATIENT)
Dept: BEHAVIORAL HEALTH | Facility: CLINIC | Age: 29
Discharge: HOME | End: 2024-02-18
Attending: FAMILY MEDICINE
Payer: COMMERCIAL

## 2024-02-18 PROCEDURE — 1002N00001 HC LODGING PLUS FACILITY CHARGE ADULT

## 2024-02-18 PROCEDURE — H2035 A/D TX PROGRAM, PER HOUR: HCPCS | Mod: HQ

## 2024-02-18 NOTE — GROUP NOTE
Psychoeducation Group Documentation    PATIENT'S NAME: Sarah Keenan  MRN:   [id number]  :   1995  ACCT. NUMBER: [id number]  DATE OF SERVICE: 24  START TIME: 12:30 PM  END TIME:  1:30 PM  FACILITATOR(S): Tobi Goldstein LADC; Donald Schwartz LADC  TOPIC: BEH Pyschoeducation  Number of patients attending the group:  7  Group Length:  1 Hours    Skills Group Therapy Type: Emotion regulation skills, Healthy behaviors development, and Relationship skills development    Summary of Group / Topics Discussed:    Relationship/social skills, Mindfulness/Relaxation skills, and Symptom management skills.      This patient attended Lecture on  Relationships  and  Self-Apology  Patient engaged in Q&A after the lecture was presented.         Group Attendance:  Attended group session    Patient's response to the group topic/interactions:  cooperative with task and listened actively    Patient appeared to be Engaged.         Client specific details:  Sarah, actively participated in group discussion and activity.

## 2024-02-18 NOTE — GROUP NOTE
Psychoeducation Group Documentation    PATIENT'S NAME: Sarah Keenan  MRN:   [id number]  :   1995  ACCT. NUMBER: [id number]  DATE OF SERVICE: 24  START TIME:  8:45 AM  END TIME: 10:45 AM  FACILITATOR(S): Donald Schwartz LADC; Baldo Stokes RN  TOPIC: BEH Pyschoeducation  Number of patients attending the group:  25  Group Length:  2 Hours    Skills Group Therapy Type: Healthy behaviors development and Medication education    Summary of Group / Topics Discussed:    Balanced lifestyle skills and Medication management skills        Group Attendance:  Attended group session    Patient's response to the group topic/interactions:  cooperative with task    Patient appeared to be Attentive and Engaged.         Client specific details:  The patient participated in the morning lecture on HIV/AIDS.

## 2024-02-19 ENCOUNTER — HOSPITAL ENCOUNTER (OUTPATIENT)
Dept: BEHAVIORAL HEALTH | Facility: CLINIC | Age: 29
Discharge: HOME | End: 2024-02-19
Attending: FAMILY MEDICINE
Payer: COMMERCIAL

## 2024-02-19 PROCEDURE — 1002N00001 HC LODGING PLUS FACILITY CHARGE ADULT

## 2024-02-19 PROCEDURE — H2035 A/D TX PROGRAM, PER HOUR: HCPCS | Mod: HQ

## 2024-02-19 NOTE — PROGRESS NOTES
[facility] Weekly Treatment Plan Review    Date span:  2-15-24 to 24    Patient did have any absences during this time period (list absence dates and reason for absence). He was excused from Skills group on 2-15-24 for an appt.    Treatment Plan initiated on: 24.    Dimension1: Acute Intoxication/Withdrawal Potential -   Previous Dimension Ratin  Current Dimension Ratin  Date of Last Use: 24  Any reports of withdrawal symptoms - No    Dimension 2: Biomedical Conditions & Complications -   Previous Dimension Ratin  Current Dimension Ratin  Medical Concerns:  None reported.  Current Medications & Medication Changes:  Current Outpatient Medications   Medication    amoxicillin (AMOXIL) 500 MG capsule    acetaminophen (TYLENOL) 325 MG tablet    alum & mag hydroxide-simethicone (MAALOX) 200-200-20 MG/5ML SUSP suspension    benzocaine-menthol (CEPACOL) 15-3.6 MG lozenge    guaiFENesin-dextromethorphan (ROBITUSSIN DM) 100-10 MG/5ML syrup    ibuprofen (ADVIL/MOTRIN) 200 MG tablet    loratadine (CLARITIN) 10 MG tablet    melatonin 3 MG tablet    senna-docusate (SENOKOT-S/PERICOLACE) 8.6-50 MG tablet     No current facility-administered medications for this encounter.     Facility-Administered Medications Ordered in Other Encounters   Medication    Self Administer Medications: Behavioral Services     Medication Prescriber: see chart  Taking meds as prescribed? Yes  Medication side effects or concerns:  None reported.  Outside medical appointments this week (list provider and reason for visit):  Pt was seen in addiction medicine on 2-15-24    Narrative: Pt seems fully functioning and seeks medical attention as needed. He attended lecture given by  nurse on HIV/AIDS on 24.     Dimension 3: Emotional/Behavioral Conditions & Complications -   Previous Dimension Ratin  Current Dimension Ratin  PHQ2:       2024    10:00 AM 2024    12:00 PM   PHQ-2 (  [employer])    Q1: Little interest or pleasure in doing things 0 0   Q2: Feeling down, depressed or hopeless 0 0   PHQ-2 Score 0 0      GAD2:       2/14/2024    12:00 PM 2/19/2024    10:00 AM   ED-2   Feeling nervous, anxious, or on edge 0 0   Not being able to stop or control worrying 0 0   ED-2 Total Score 0 0     Mental health diagnosis: none  Date of last SIB:  Patient denies.  Date of  last SI:  Patient denies.  Date of last HI: Patient denies.  Behavioral Targets: Follow recommendations of medical provider. Report any alcohol or drug use to counselor and any increase in withdrawal symptoms to nurse. Stabilize and maintain mental health.   Risk factors: The patient lacks relapse prevention, coping, and refusal skills, as well as a sober peer support network. He has a tendency to isolate and a significant history of trauma, guilt, grief, and loss issues. He has current legal issues.  Protective factors:  abstinence from substances, adherence with prescribed medication, agreement to use safety plan, living with other people, structured day, and access to a variety of clinical interventions  Current MH Assignments:  none at this time    Narrative: Current Mental Health symptoms include: none reported or observed. See below for suicide risk assessment. Pt does not endorse thoughts of self-harm or suicide ideation at this time. Pt's current CGI is 5/4.  He reports that his stress level has not significantly changed this week. Pt reported that his mood has not significantly changed this past week.    [place] Suicide Severity Rating Scale (Short Version)      2/9/2024    10:00 AM 2/13/2024     1:39 PM   [place] Suicide Severity Rating (Short Version)   Over the past 2 weeks have you felt down, depressed, or hopeless?  no   Over the past 2 weeks have you had thoughts of killing yourself?  no   Have you ever attempted to kill yourself?  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 He appears to be in the contemplative stage of change at this time.  MAR[facility] assignments -  How Substance Use Affects my Behavior,   Benefits and Consequences of Using Alcohol and Drugs     Narrative - Pt reports his motivation this week is \"at first, I thought, 'I'll just complete the court order, then I'll be done,' but when I see the benefit of treatment I decided to be sober.\" Pt reports that his aftercare plans include outpatient, meetings, and connecting with his Sikhism. He reports that he has gotten along with peers and staff this week.     Dimension 5: Relapse / Continued Problem Potential -   Previous Dimension Ratin  Current Dimension Ratin  Relapses this week - None  Urges to use - None  UA results - negative for all screened drugs    Narrative- Pt reports no cravings this past week. He reported not experiencing any triggers to use, but used the following coping skill(s): \"one of the speakers shared in his story - I'll carry the medallion at all tines to remind myself to be sober.\" Pt participated in the spirituality group, facilitated by Earlene Benitez and  counseling staff was present during group.    Dimension 6: Recovery Environment -   Previous Dimension Rating:  3  Current Dimension Rating:  3  Family Involvement - n/a  Summarize attendance at family groups and family sessions - n/a  Family supportive of treatment?  Yes  Recreational activities - none reported    Narrative - Pt is spending free time with " same-gender peers and attending at least 3 virtual 12-step meetings weekly while in [place]. He reports not having had contact with his loved ones this past week. Pt participated in weekend workshop on relationships and completed all activities.    Progress made on transition planning goals: see chart    Justification for Continued Treatment at this Level of Care: Risk ratings indicate continued need for this level of care. Pt has been unable to maintain abstinence from drugs while at the outpatient level of care, lacks long-term sober maintenance skills, lacks sober coping skills and has mental health concerns which are exacerbated by substance use.   Treatment coordination activities this week:  coordination with  and referrals to [facility] services including Addiction Medicine  Need for peer recovery support referral? No    Discharge Planning:  Target Discharge Date/Timeframe:  3-8-24  Med Mgmt Provider/Appt:  LISA  Ind therapy Provider/Appt:  TBD  Family therapy Provider/Appt:  TBD  Other referrals:  none at this time.    Has vulnerable adult status changed? No    Interdisciplinary Clinical Supervision including: MORENITA, Mental health professional, Medical provider, and RN    Are Treatment Plan goals/objectives effective? Yes  *If no, list changes to treatment plan:    Are the current goals meeting client's needs? Yes  *If no, list the changes to treatment plan.    Patient Input / Response: Pt contributed to treatment plan review.    *Client agrees with any changes to the treatment plan: N/A  *Client received copy of changes: N/A  *Client is aware of right to access a treatment plan review: Yes    MORENITA Allen

## 2024-02-19 NOTE — GROUP NOTE
Group Therapy Documentation    PATIENT'S NAME: Sarah Keenan  MRN:   [id number]  :   1995  ACCT. NUMBER: [id number]  DATE OF SERVICE: 24  START TIME:  9:00 AM  END TIME: 11:00 AM  FACILITATOR(S): Bg Martin LADC  TOPIC: BEH Group Therapy  Number of patients attending the group:  7  Group Length:  2 Hours    Group Therapy Type: Recovery strategies and Emotion processing    Summary of Group / Topics Discussed:    Recovery Principles, Sober coping skills, Balanced lifestyle, Disease of addiction, Emotions/expression, and Relapse prevention      Group Attendance:  Attended group session    Patient's response to the group topic/interactions:  cooperative with task, discussed personal experience with topic, expressed readiness to alter behaviors, expressed understanding of topic, gave appropriate feedback to peers, and listened actively    Patient appeared to be Actively participating, Attentive, and Engaged.        Client specific details:  Sarah gave appropriate feedback. .He presented his relapse plan.

## 2024-02-19 NOTE — GROUP NOTE
Group Therapy Documentation    PATIENT'S NAME: Sarah Keenan  MRN:   [id number]  :   1995  ACCT. NUMBER: [id number]  DATE OF SERVICE: 24  START TIME: 12:30 PM  END TIME:  2:30 PM  FACILITATOR(S): Edna Plasencia LADC  TOPIC: BEH Group Therapy  Number of patients attending the group:  7  Group Length:  2 Hours    Group Therapy Type: Emotion processing    Summary of Group / Topics Discussed:    Co-occurring illnesses symptom management, Disease of addiction, and Emotions/expression      Group Attendance:  Attended group session    Patient's response to the group topic/interactions:  cooperative with task    Patient appeared to be Actively participating, Attentive, and Engaged.        Client specific details: Sarah was an active participant in afternoon group therapy session.

## 2024-02-20 ENCOUNTER — HOSPITAL ENCOUNTER (OUTPATIENT)
Dept: BEHAVIORAL HEALTH | Facility: CLINIC | Age: 29
Discharge: HOME | End: 2024-02-20
Attending: FAMILY MEDICINE
Payer: COMMERCIAL

## 2024-02-20 PROCEDURE — 1002N00001 HC LODGING PLUS FACILITY CHARGE ADULT

## 2024-02-20 PROCEDURE — H2035 A/D TX PROGRAM, PER HOUR: HCPCS | Mod: HQ

## 2024-02-20 NOTE — GROUP NOTE
"Group Therapy Documentation    PATIENT'S NAME: Sarah Keenan  MRN:   [id number]  :   1995  ACCT. NUMBER: [id number]  DATE OF SERVICE: 24  START TIME: 12:30 PM  END TIME:  2:30 PM  FACILITATOR(S): Donald Schwartz LADC  TOPIC: BEH Group Therapy  Number of patients attending the group:  8  Group Length:  2 Hours    Group Therapy Type: Recovery strategies    Summary of Group / Topics Discussed:    Recovery Principles, Mindfulness/Relaxation, Coping/DBT informed care, Trauma informed care, Disease of addiction, and Emotions/expression      Group Attendance:  Attended group session    Patient's response to the group topic/interactions:  confronted peers appropriately    Patient appeared to be Attentive and Engaged.        Client specific details:  Sarah participated in afternoon group. He took part in a discussion on DBT and went over several skills. He listened to and gave positive feedback on his peer's assignment. He took part in an activity and discussion on setting goals. He presented his \"Understanding Relapse\" assignment to the group.    "

## 2024-02-20 NOTE — GROUP NOTE
Group Therapy Documentation    PATIENT'S NAME: Sarah Keenan  MRN:   [id number]  :   1995  ACCT. NUMBER: [id number]  DATE OF SERVICE: 24  START TIME:  3:00 PM  END TIME:  4:00 PM  FACILITATOR(S): Linus Marcelino LADC; Abdirashid Gottlieb, [facility]  TOPIC: BEH Group Therapy  Number of patients attending the group:  8  Group Length:  1 Hours    Group Therapy Type: Daily living/independence skills    Summary of Group / Topics Discussed:    Relationship/socialization      Group Attendance:  Attended group session    Patient's response to the group topic/interactions:  cooperative with task    Patient appeared to be Actively participating.        Client specific details:  Sarah participated and interacted appropriately with peers and staff in skills group. No triggers to use noted or discussed.

## 2024-02-20 NOTE — GROUP NOTE
Group Therapy Documentation    PATIENT'S NAME: Sarah Keenan  MRN:   [id number]  :   1995  ACCT. NUMBER: [id number]  DATE OF SERVICE: 24  START TIME:  9:00 AM  END TIME: 11:00 AM  FACILITATOR(S): Bg Martin LADC  TOPIC: BEH Group Therapy  Number of patients attending the group:  8  Group Length:  2 Hours    Group Therapy Type: Recovery strategies and Emotion processing    Summary of Group / Topics Discussed:    Recovery Principles, Sober coping skills, Relationship/socialization, Balanced lifestyle, Disease of addiction, Emotions/expression, and Relapse prevention      Group Attendance:  Attended group session    Patient's response to the group topic/interactions:  cooperative with task, discussed personal experience with topic, expressed readiness to alter behaviors, expressed understanding of topic, gave appropriate feedback to peers, and listened actively    Patient appeared to be Actively participating, Attentive, and Engaged.        Client specific details:  Sarah gave appropriate feedback. .

## 2024-02-21 ENCOUNTER — HOSPITAL ENCOUNTER (OUTPATIENT)
Dept: BEHAVIORAL HEALTH | Facility: CLINIC | Age: 29
Discharge: HOME | End: 2024-02-21
Attending: FAMILY MEDICINE
Payer: COMMERCIAL

## 2024-02-21 PROCEDURE — 1002N00001 HC LODGING PLUS FACILITY CHARGE ADULT

## 2024-02-21 PROCEDURE — H2035 A/D TX PROGRAM, PER HOUR: HCPCS | Mod: HQ

## 2024-02-21 NOTE — GROUP NOTE
Group Therapy Documentation    PATIENT'S NAME: Sarah Keenan  MRN:   [id number]  :   1995  ACCT. NUMBER: [id number]  DATE OF SERVICE: 24  START TIME:  9:40 AM  END TIME: 11:20 AM  FACILITATOR(S): Bg Martin LADC  TOPIC: BEH Group Therapy  Number of patients attending the group:  9  Group Length:  2 Hours    Group Therapy Type: Recovery strategies and Emotion processing    Summary of Group / Topics Discussed:    Recovery Principles, Sober coping skills, Balanced lifestyle, Disease of addiction, Emotions/expression, and Relapse prevention      Group Attendance:  Attended group session    Patient's response to the group topic/interactions:  cooperative with task, discussed personal experience with topic, expressed readiness to alter behaviors, expressed understanding of topic, gave appropriate feedback to peers, and listened actively    Patient appeared to be Actively participating, Attentive, and Engaged.        Client specific details:  Sarah gave appropriate feedback. .

## 2024-02-21 NOTE — GROUP NOTE
Psychoeducation Group Documentation    PATIENT'S NAME: Sarah Keenan  MRN:   [id number]  :   1995  ACCT. NUMBER: [id number]  DATE OF SERVICE: 24  START TIME:  8:30 AM  END TIME:  9:30 AM  FACILITATOR(S): Bg Martin LADC; Triston Pitts MD  TOPIC: BEH Pyschoeducation  Number of patients attending the group:  29  Group Length:  1 Hours    Skills Group Therapy Type: Recovery skills and Healthy behaviors development    Summary of Group / Topics Discussed:    Balanced lifestyle skills and Symptom management skills        Group Attendance:  Attended group session    Patient's response to the group topic/interactions:  cooperative with task and listened actively    Patient appeared to be Attentive and Engaged.         Client specific details:  Sarah gave appropriate feedback. .

## 2024-02-21 NOTE — GROUP NOTE
Group Therapy Documentation    PATIENT'S NAME: Sarah Keenan  MRN:   [id number]  :   1995  ACCT. NUMBER: [id number]  DATE OF SERVICE: 24  START TIME: 12:30 PM  END TIME:  2:30 PM  FACILITATOR(S): Edna Plasencia LADC  TOPIC: BEH Group Therapy  Number of patients attending the group:  9  Group Length:  2 Hours    Group Therapy Type: Emotion processing    Summary of Group / Topics Discussed:    Co-occurring illnesses symptom management, Disease of addiction, and Emotions/expression      Group Attendance:  Attended group session    Patient's response to the group topic/interactions:  cooperative with task    Patient appeared to be Actively participating, Attentive, and Engaged.        Client specific details: Sarah was an active participant in afternoon group therapy session.

## 2024-02-22 ENCOUNTER — HOSPITAL ENCOUNTER (OUTPATIENT)
Dept: BEHAVIORAL HEALTH | Facility: CLINIC | Age: 29
Discharge: HOME | End: 2024-02-22
Attending: FAMILY MEDICINE
Payer: COMMERCIAL

## 2024-02-22 DIAGNOSIS — F15.90 STIMULANT USE DISORDER: ICD-10-CM

## 2024-02-22 LAB — SARS-COV-2 RNA RESP QL NAA+PROBE: NEGATIVE

## 2024-02-22 PROCEDURE — H2035 A/D TX PROGRAM, PER HOUR: HCPCS | Mod: HQ

## 2024-02-22 PROCEDURE — 1002N00001 HC LODGING PLUS FACILITY CHARGE ADULT

## 2024-02-22 PROCEDURE — 87635 SARS-COV-2 COVID-19 AMP PRB: CPT

## 2024-02-22 NOTE — GROUP NOTE
Group Therapy Documentation    PATIENT'S NAME: Sarah Keenan  MRN:   [id number]  :   1995  ACCT. NUMBER: [id number]  DATE OF SERVICE: 24  START TIME:  3:00 PM  END TIME:  4:00 PM  FACILITATOR(S): Tobi Goldstein LADC; John Prado LADC  TOPIC: BEH Group Therapy  Number of patients attending the group:  23  Group Length:  1 Hours    Group Therapy Type: Recovery strategies and Daily living/independence skills    Summary of Group / Topics Discussed:    Sober coping skills      Group lecture was presented by counseling staff regarding the topic of exploring personal strengths. Participants provided feedback throughout group session.       Group Attendance:  Attended group session    Patient's response to the group topic/interactions:  cooperative with task    Patient appeared to be Actively participating.        Client specific details:  Patient actively engaged in group discussion.

## 2024-02-22 NOTE — GROUP NOTE
Group Therapy Documentation    PATIENT'S NAME: Sarah Keenan  MRN:   [id number]  :   1995  ACCT. NUMBER: [id number]  DATE OF SERVICE: 24  START TIME: 12:30 PM  END TIME:  2:30 PM  FACILITATOR(S): Malorie Gutierrez LADC  TOPIC: BEH Group Therapy  Number of patients attending the group:  9    Group Length:  2 Hours    Group Therapy Type: Recovery strategies    Summary of Group / Topics Discussed:    Spiritual Care      Group Attendance:  Attended group session    Patient's response to the group topic/interactions:  cooperative with task    Patient appeared to be Actively participating, Attentive, and Engaged.        Client specific details:  Patient attended group session and was attentive and participative.

## 2024-02-23 ENCOUNTER — HOSPITAL ENCOUNTER (OUTPATIENT)
Dept: BEHAVIORAL HEALTH | Facility: CLINIC | Age: 29
Discharge: HOME | End: 2024-02-23
Attending: FAMILY MEDICINE
Payer: COMMERCIAL

## 2024-02-23 PROCEDURE — 1002N00001 HC LODGING PLUS FACILITY CHARGE ADULT

## 2024-02-23 PROCEDURE — H2035 A/D TX PROGRAM, PER HOUR: HCPCS | Mod: HQ

## 2024-02-23 NOTE — GROUP NOTE
Group Therapy Documentation    PATIENT'S NAME: Sarah Keenan  MRN:   [id number]  :   1995  ACCT. NUMBER: [id number]  DATE OF SERVICE: 24  START TIME: 12:30 PM  END TIME:  2:30 PM  FACILITATOR(S): Nafisa Youngblood LADC; Mariola Starks LADC; Linus Marcelino LADC  TOPIC: BEH Group Therapy  Number of patients attending the group:  21  Group Length:  2 Hours    Group Therapy Type: Recovery strategies and Emotion processing    Summary of Group / Topics Discussed:    Relationship/socialization, Emotions/expression, and Leisure explorations/use of leisure time    Group Attendance:  Attended group session    Patient's response to the group topic/interactions:  cooperative with task    Patient appeared to be Attentive and Engaged.        Client specific details: Pt watched a recovery-related film with peers and participated in the subsequent discussion.

## 2024-02-23 NOTE — GROUP NOTE
"Group Therapy Documentation    PATIENT'S NAME: Sarah Keenan  MRN:   [id number]  :   1995  ACCT. NUMBER: [id number]  DATE OF SERVICE: 24  START TIME:  9:00 AM  END TIME: 11:00 AM  FACILITATOR(S): Bg Martin LADC  TOPIC: BEH Group Therapy  Number of patients attending the group:  9  Group Length:  2 Hours    Group Therapy Type: Recovery strategies and Emotion processing    Summary of Group / Topics Discussed:    Recovery Principles, Sober coping skills, Balanced lifestyle, Disease of addiction, Emotions/expression, Relapse prevention, and Self-care activities      Group Attendance:  Attended group session    Patient's response to the group topic/interactions:  cooperative with task, discussed personal experience with topic, expressed readiness to alter behaviors, expressed understanding of topic, gave appropriate feedback to peers, and listened actively    Patient appeared to be Actively participating, Attentive, and Engaged.        Client specific details:  Sarah gave appropriate feedback. .He presented his \"Drug Use History\" assignment.     "

## 2024-02-24 ENCOUNTER — HOSPITAL ENCOUNTER (OUTPATIENT)
Dept: BEHAVIORAL HEALTH | Facility: CLINIC | Age: 29
Discharge: HOME | End: 2024-02-24
Attending: FAMILY MEDICINE
Payer: COMMERCIAL

## 2024-02-24 DIAGNOSIS — F15.90 STIMULANT USE DISORDER: ICD-10-CM

## 2024-02-24 LAB — SARS-COV-2 RNA RESP QL NAA+PROBE: NEGATIVE

## 2024-02-24 PROCEDURE — H2035 A/D TX PROGRAM, PER HOUR: HCPCS | Mod: HQ

## 2024-02-24 PROCEDURE — 1002N00001 HC LODGING PLUS FACILITY CHARGE ADULT

## 2024-02-24 PROCEDURE — 87635 SARS-COV-2 COVID-19 AMP PRB: CPT

## 2024-02-24 NOTE — GROUP NOTE
Group Therapy Documentation    PATIENT'S NAME: Sarah Keenan  MRN:   [id number]  :   1995  ACCT. NUMBER: [id number]  DATE OF SERVICE: 24  START TIME: 12:30 PM  END TIME:  2:30 PM  FACILITATOR(S): Elsa Sandoval; Edna Plasencia LADC  TOPIC: BEH Group Therapy  Number of patients attending the group:  20  Group Length:  2 Hours    Group Therapy Type: Recovery strategies    Summary of Group / Topics Discussed:    Recovery Principles, Sober coping skills, Relationship/socialization, Balanced lifestyle, Emotions/expression, and Relapse prevention      Group Attendance:  Attended group session    Patient's response to the group topic/interactions:  cooperative with task, expressed understanding of topic, gave appropriate feedback to peers, and listened actively    Patient appeared to be Actively participating, Attentive, and Engaged.        Client specific details:  Client was engaged throughout the duration of group by asking questions, engaging peers in conversations, and sharing experiences as appropriate to the topic. Pt gave and received feedback/support to group members.

## 2024-02-24 NOTE — GROUP NOTE
Group Therapy Documentation    PATIENT'S NAME: Sarah Keenan  MRN:   [id number]  :   1995  ACCT. NUMBER: [id number]  DATE OF SERVICE: 24  START TIME:  9:00 AM  END TIME: 11:00 AM  FACILITATOR(S): Edna Plasencia LADC; Elsa Sandoval LADC  TOPIC: BEH Group Therapy  Number of patients attending the group:  21  Group Length:  2 Hours    Group Therapy Type: Recovery strategies    Summary of Group / Topics Discussed:    Relationship/socialization      Group Attendance:  Attended group session    Patient's response to the group topic/interactions:  cooperative with task    Patient appeared to be Attentive and Engaged.        Client specific details: Sarah was an active participant in lecture on healthy relationship characteristics.

## 2024-02-25 ENCOUNTER — HOSPITAL ENCOUNTER (OUTPATIENT)
Dept: BEHAVIORAL HEALTH | Facility: CLINIC | Age: 29
Discharge: HOME | End: 2024-02-25
Attending: FAMILY MEDICINE
Payer: COMMERCIAL

## 2024-02-25 DIAGNOSIS — F15.90 STIMULANT USE DISORDER: ICD-10-CM

## 2024-02-25 LAB — SARS-COV-2 RNA RESP QL NAA+PROBE: NEGATIVE

## 2024-02-25 PROCEDURE — 1002N00001 HC LODGING PLUS FACILITY CHARGE ADULT

## 2024-02-25 PROCEDURE — H2035 A/D TX PROGRAM, PER HOUR: HCPCS | Mod: HQ

## 2024-02-25 PROCEDURE — 87635 SARS-COV-2 COVID-19 AMP PRB: CPT

## 2024-02-25 NOTE — GROUP NOTE
Group Therapy Documentation    PATIENT'S NAME: Sarah Keenan  MRN:   [id number]  :   1995  ACCT. NUMBER: [id number]  DATE OF SERVICE: 24  START TIME: 12:30 PM  END TIME:  1:30 PM  FACILITATOR(S): Donald Schwartz LADC  TOPIC: BEH Group Therapy  Number of patients attending the group:  18  Group Length:  1 Hours    Group Therapy Type: Recovery strategies    Summary of Group / Topics Discussed:    Recovery Principles and Relationship/socialization      Group Attendance:  Attended group session    Patient's response to the group topic/interactions:  cooperative with task    Patient appeared to be Attentive and Engaged.        Client specific details:  The patient participated in the afternoon lecture on Family and Addiction.

## 2024-02-25 NOTE — GROUP NOTE
Group Therapy Documentation    PATIENT'S NAME: Sarah Keenan  MRN:   [id number]  :   1995  ACCT. NUMBER: [id number]  DATE OF SERVICE: 24  START TIME:  8:45 AM  END TIME: 10:30 AM  FACILITATOR(S): Edna Plasencia LADC; Rosanna Gipson RN; Donald Schwartz LADC  TOPIC: BEH Group Therapy  Number of patients attending the group:  18  Group Length:  2 Hours    Group Therapy Type: Health and wellbeing     Summary of Group / Topics Discussed:    Hepatitis C      Group Attendance:  Attended group session    Patient's response to the group topic/interactions:  cooperative with task    Patient appeared to be Attentive and Engaged.        Client specific details: Sarah was an active participant in RN lecture on Hepatitis C.

## 2024-02-26 ENCOUNTER — HOSPITAL ENCOUNTER (OUTPATIENT)
Dept: BEHAVIORAL HEALTH | Facility: CLINIC | Age: 29
Discharge: HOME | End: 2024-02-26
Attending: FAMILY MEDICINE
Payer: COMMERCIAL

## 2024-02-26 DIAGNOSIS — F15.90 STIMULANT USE DISORDER: ICD-10-CM

## 2024-02-26 LAB — SARS-COV-2 RNA RESP QL NAA+PROBE: NEGATIVE

## 2024-02-26 PROCEDURE — H2035 A/D TX PROGRAM, PER HOUR: HCPCS | Mod: HQ

## 2024-02-26 PROCEDURE — 1002N00001 HC LODGING PLUS FACILITY CHARGE ADULT

## 2024-02-26 PROCEDURE — 87635 SARS-COV-2 COVID-19 AMP PRB: CPT

## 2024-02-26 NOTE — GROUP NOTE
Group Therapy Documentation    PATIENT'S NAME: Sarah Keenan  MRN:   [id number]  :   1995  ACCT. NUMBER: [id number]  DATE OF SERVICE: 24  START TIME:  9:00 AM  END TIME: 11:00 AM  FACILITATOR(S): Edna Plasencia LADC  TOPIC: BEH Group Therapy  Number of patients attending the group:  8  Group Length:  2 Hours    Group Therapy Type: Emotion processing    Summary of Group / Topics Discussed:    Disease of addiction and Emotions/expression      Group Attendance:  Attended group session    Patient's response to the group topic/interactions:  cooperative with task    Patient appeared to be Actively participating, Attentive, and Engaged.        Client specific details: Sarah was an active participant in morning group therapy session. He participated in a goal-setting exercise and offered feedback to a peer who shared his Drug Use History.

## 2024-02-26 NOTE — PROGRESS NOTES
Nursing Discharge Planning Meeting    Writer completed discharge planning meeting with patient. Discharge is planned for 3/8/24    Discussed appropriate follow up care to manage MARLY, MI, medical and to obtain medication refills. Patient given a copy of their current medications for reference. Questions were answered at this time and the patient verbalized an understanding of the post-discharge follow up plan.    Patient will f/u with PCP as recommended and/or is aware of upcoming appt:    3/12/2024 Status: Scheduled      Time: 2:00 PM Length: 30     Visit Type: NEW - PREVENTIVE ADULT [2981] Copay: $0.00     Provider: Melani Dorantes MD Department: [facility]     Encounter #: [id number] Notes: Annual Physical   Printed on:         Made On: 1/19/2024 3:19 PM           [address] [place] [address] 1  [place] [address]    Department Phone: [phone]   outcome           Patient will f/u with Addiction Medicine Provider as recommended and/or is aware of upcoming appt:  [facility]  [address] [place] [address], [address] [place] [id number] [phone]   Bere Escamilla      Continue to support patient in discharge planning as needed to assure appropriate continuity of care.     Tobacco Cessation  Patient participated in the nicotine replacement therapy for tobacco cessation or reduction during their treatment programming: No, pt declined     The patient was provided with community resources for follow-up to continue tobacco cessation support once in the community. Also the patient was encouraged to discuss their tobacco cessation efforts with the primary care provider.    [facility]  Nurse Liaison / [facility]  O: [phone]  Fax:[phone]  [facility] [id number]  M-F: 7AM to 5:30PM 3/8  Sat-Sun: 7AM to 3PM  After hours: [phone]

## 2024-02-26 NOTE — PROGRESS NOTES
"[facility] Weekly Treatment Plan Review    Date span:  24 to 24    Patient did have any absences during this time period (list absence dates and reason for absence). He was excused from Skills group on 2-15-24 for an appt.    Treatment Plan initiated on: 24.    Dimension1: Acute Intoxication/Withdrawal Potential -   Previous Dimension Ratin  Current Dimension Ratin  Date of Last Use: 24  Any reports of withdrawal symptoms - No    Dimension 2: Biomedical Conditions & Complications -   Previous Dimension Ratin  Current Dimension Ratin  Medical Concerns:  None reported.  Current Medications & Medication Changes:  No current outpatient medications on file.     No current facility-administered medications for this encounter.     Facility-Administered Medications Ordered in Other Encounters   Medication    Self Administer Medications: Behavioral Services     Medication Prescriber: Patient reported \"Amoxi, from a doctor in the ER.\"  Taking meds as prescribed? Yes  Medication side effects or concerns: None reported.  Outside medical appointments this week (list provider and reason for visit):  None reported    Narrative: Pt seems fully functioning and seeks medical attention as needed. He attended lecture given by LP nurse on HEP C on 24.     Dimension 3: Emotional/Behavioral Conditions & Complications -   Previous Dimension Ratin  Current Dimension Ratin  PHQ2:       2024    10:00 AM 2024    10:00 AM 2024    12:00 PM   PHQ-2 (  [employer])   Q1: Little interest or pleasure in doing things 0 0 0   Q2: Feeling down, depressed or hopeless 0 0 0   PHQ-2 Score 0 0 0      GAD2:       2024    12:00 PM 2024    10:00 AM 2024    10:00 AM   ED-2   Feeling nervous, anxious, or on edge 0 0 0   Not being able to stop or control worrying 0 0 0   ED-2 Total Score 0 0 0     Mental health diagnosis: none  Date of last SIB:  Patient denies.  Date of  " last SI:  Patient denies.  Date of last HI: Patient denies.  Behavioral Targets: Follow recommendations of medical provider. Report any alcohol or drug use to counselor and any increase in withdrawal symptoms to nurse. Stabilize and maintain mental health.   Risk factors: The patient lacks relapse prevention, coping, and refusal skills, as well as a sober peer support network. He has a tendency to isolate and a significant history of trauma, guilt, grief, and loss issues. He has current legal issues.  Protective factors:  abstinence from substances, adherence with prescribed medication, agreement to use safety plan, living with other people, structured day, and access to a variety of clinical interventions  Current MH Assignments:  none at this time    Narrative: Current Mental Health symptoms include: none reported or observed. See below for suicide risk assessment. Pt does not endorse thoughts of self-harm or suicide ideation at this time. Pt's current CGI is 5/4.  He reports that his stress level has not significantly changed this week. Pt reported that his mood has not significantly changed this past week. Patient did not identify any coping skills that he might use to manage stress and difficult emotions.    Woodman Suicide Severity Rating Scale (Short Version)      2/9/2024    10:00 AM 2/13/2024     1:39 PM   Woodman Suicide Severity Rating (Short Version)   Over the past 2 weeks have you felt down, depressed, or hopeless?  no   Over the past 2 weeks have you had thoughts of killing yourself?  no   Have you ever attempted to kill yourself?  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 He appears to be in the contemplative stage of change at this time.  MARLY assignments -  How Substance Use Affects my Behavior,   Benefits and Consequences of Using Alcohol and Drugs     Narrative - Pt reports his motivation this week is \"my family, and my PO.\" Pt reports that his aftercare plans include outpatient, meetings, and connecting with his Christianity. He reports that he has gotten along with peers and staff this week.     Dimension 5: Relapse / Continued Problem Potential -   Previous Dimension Ratin  Current Dimension Ratin  Relapses this week - None  Urges to use - None  UA results - negative for all screened drugs    Narrative- Pt reports no cravings this past week. He reported not experiencing any triggers to use, but used the following coping skill(s): \"having a conversation with others, sing a song, and be happy.\" Pt participated in the spirituality group, facilitated by Earlene Benitez and  counseling staff was present during group.    Dimension 6: Recovery Environment -   Previous Dimension Rating:  3  Current Dimension Rating:  3  Family Involvement - n/a  Summarize attendance at family groups and family sessions - n/a  Family supportive of treatment?  Yes  Recreational activities - none reported    Narrative - Pt is spending free time with same-gender peers and attending at least 3 virtual 12-step meetings weekly while in . He reports not having had contact with his loved ones this past week. Pt participated in weekend workshop on relationships and completed all activities.    Progress made on transition planning goals: Patient has been completing and presenting his assignments.    Justification for Continued Treatment at this Level of Care: Risk ratings indicate continued need for " this level of care. Pt has been unable to maintain abstinence from drugs while at the outpatient level of care, lacks long-term sober maintenance skills, lacks sober coping skills and has mental health concerns which are exacerbated by substance use.   Treatment coordination activities this week:  coordination with  and referrals to [facility] services including Addiction Medicine  Need for peer recovery support referral? No    Discharge Planning:  Target Discharge Date/Timeframe:  3-8-24  Med Mgmt Provider/Appt:  TBD  Ind therapy Provider/Appt:  TBD  Family therapy Provider/Appt:  TBD  Other referrals:  none at this time.    Has vulnerable adult status changed? No    Interdisciplinary Clinical Supervision including: MORENITA, Mental health professional, Medical provider, and RN    Are Treatment Plan goals/objectives effective? Yes  *If no, list changes to treatment plan:    Are the current goals meeting client's needs? Yes  *If no, list the changes to treatment plan.    Patient Input / Response: Pt contributed to treatment plan review.    *Client agrees with any changes to the treatment plan: N/A  *Client received copy of changes: N/A  *Client is aware of right to access a treatment plan review: Yes    MORENITA Herrera

## 2024-02-26 NOTE — GROUP NOTE
Group Therapy Documentation    PATIENT'S NAME: Sarah Keenan  MRN:   [id number]  :   1995  ACCT. NUMBER: [id number]  DATE OF SERVICE: 24  START TIME: 12:30 PM  END TIME:  2:30 PM  FACILITATOR(S): Donald Schwartz LADC  TOPIC: BEH Group Therapy  Number of patients attending the group:  8  Group Length:  2 Hours    Group Therapy Type: Recovery strategies    Summary of Group / Topics Discussed:    Recovery Principles, Mindfulness/Relaxation, Coping/DBT informed care, Trauma informed care, Disease of addiction, and Emotions/expression      Group Attendance:  Attended group session    Patient's response to the group topic/interactions:  cooperative with task    Patient appeared to be Attentive and Engaged.        Client specific details:  Sarah participated in the afternoon group. He took part in a reading and discussion on AA and NA meeting information. As well as a discussion on GOD, a Higher Power, and the difference between Sikhism and Spirituality. For the second half of group he listened to and gave positive feedback on his peer's assignments.

## 2024-02-27 ENCOUNTER — HOSPITAL ENCOUNTER (OUTPATIENT)
Dept: BEHAVIORAL HEALTH | Facility: CLINIC | Age: 29
Discharge: HOME | End: 2024-02-27
Attending: FAMILY MEDICINE
Payer: COMMERCIAL

## 2024-02-27 LAB — SARS-COV-2 RNA RESP QL NAA+PROBE: NEGATIVE

## 2024-02-27 PROCEDURE — 1002N00001 HC LODGING PLUS FACILITY CHARGE ADULT

## 2024-02-27 PROCEDURE — 87635 SARS-COV-2 COVID-19 AMP PRB: CPT | Performed by: FAMILY MEDICINE

## 2024-02-27 PROCEDURE — H2035 A/D TX PROGRAM, PER HOUR: HCPCS | Mod: HQ

## 2024-02-27 NOTE — GROUP NOTE
Psychoeducation Group Documentation    PATIENT'S NAME: Sarah Keenan  MRN:   [id number]  :   1995  ACCT. NUMBER: [id number]  DATE OF SERVICE: 24  START TIME:  3:00 PM  END TIME:  4:00 PM  FACILITATOR(S): Nafisa Youngblood LADC; Malorie Gutierrez LADC; Donald Schwartz LADC  TOPIC: BEH Pyschoeducation  Number of patients attending the group:  20  Group Length:  1 Hours    Skills Group Therapy Type: Recovery skills and Healthy behaviors development    Summary of Group / Topics Discussed:    Balanced lifestyle skills and Coping/DBT skills    Group Attendance:  Attended group session    Patient's response to the group topic/interactions:  cooperative with task    Patient appeared to be Attentive and Engaged.        Client specific details: Pt respectfully watched a presentation on DBT skills and asked appropriate questions.

## 2024-02-27 NOTE — GROUP NOTE
"Group Therapy Documentation    PATIENT'S NAME: Sarah Keenan  MRN:   [id number]  :   1995  ACCT. NUMBER: [id number]  DATE OF SERVICE: 24  START TIME: 12:30 PM  END TIME:  2:30 PM  FACILITATOR(S): Nafisa Youngblood LADC  TOPIC: BEH Group Therapy  Number of patients attending the group:  8  Group Length:  2 Hours    Group Therapy Type: Recovery strategies and Emotion processing    Summary of Group / Topics Discussed:    Sober coping skills, Emotions/expression, and Relapse prevention    Group Attendance:  Attended group session    Patient's response to the group topic/interactions:  cooperative with task    Patient appeared to be Actively participating, Attentive, and Engaged.        Client specific details: Pt shared his \"25 Coping Skills\" assignment, and offered his peer feedback on their \"Anxiety and Recovery from Chemical Dependency\" assignment. He took part in a group discussion of the meaning of success, healing from hurt and managing anxiety.  "

## 2024-02-27 NOTE — GROUP NOTE
Group Therapy Documentation    PATIENT'S NAME: Sarah Keenan  MRN:   [id number]  :   1995  ACCT. NUMBER: [id number]  DATE OF SERVICE: 24  START TIME:  9:00 AM  END TIME: 11:00 AM  FACILITATOR(S): Edna Plasencia LADC  TOPIC: BEH Group Therapy  Number of patients attending the group:  8  Group Length:  2 Hours    Group Therapy Type: Emotion processing    Summary of Group / Topics Discussed:    Recovery Principles, Sober coping skills, and Disease of addiction      Group Attendance:  Attended group session    Patient's response to the group topic/interactions:  cooperative with task    Patient appeared to be Actively participating, Attentive, and Engaged.        Client specific details: Sarah was an active participant in morning group therapy session. He engaged in a discussion on aftercare planning.

## 2024-02-28 ENCOUNTER — HOSPITAL ENCOUNTER (OUTPATIENT)
Dept: BEHAVIORAL HEALTH | Facility: CLINIC | Age: 29
Discharge: HOME | End: 2024-02-28
Attending: FAMILY MEDICINE
Payer: COMMERCIAL

## 2024-02-28 PROCEDURE — H2035 A/D TX PROGRAM, PER HOUR: HCPCS | Mod: HQ

## 2024-02-28 PROCEDURE — 1002N00001 HC LODGING PLUS FACILITY CHARGE ADULT

## 2024-02-28 NOTE — GROUP NOTE
Group Therapy Documentation    PATIENT'S NAME: Sarah Keenan  MRN:   [id number]  :   1995  ACCT. NUMBER: [id number]  DATE OF SERVICE: 24  START TIME:  9:40 AM  END TIME: 11:20 AM  FACILITATOR(S): Bg Martin LADC  TOPIC: BEH Group Therapy  Number of patients attending the group:  9  Group Length:  2 Hours    Group Therapy Type: Recovery strategies and Emotion processing    Summary of Group / Topics Discussed:    Recovery Principles, Sober coping skills, Relationship/socialization, Balanced lifestyle, Disease of addiction, Emotions/expression, and Relapse prevention      Group Attendance:  Attended group session    Patient's response to the group topic/interactions:  cooperative with task, discussed personal experience with topic, expressed readiness to alter behaviors, expressed understanding of topic, gave appropriate feedback to peers, and listened actively    Patient appeared to be Actively participating, Attentive, and Engaged.        Client specific details:  Sarah gave appropriate feedback. .

## 2024-02-28 NOTE — GROUP NOTE
Psychoeducation Group Documentation    PATIENT'S NAME: Sarah Keenan  MRN:   [id number]  :   1995  ACCT. NUMBER: [id number]  DATE OF SERVICE: 24  START TIME:  8:30 AM  END TIME:  9:30 AM  FACILITATOR(S): Bg Martin LADC; Linus Marcelino LADC; Shirley Walker LADC  TOPIC: BEH Pyschoeducation  Number of patients attending the group:  26  Group Length:  1 Hours    Skills Group Therapy Type: Recovery skills    Summary of Group / Topics Discussed:    Balanced lifestyle skills        Group Attendance:  Attended group session    Patient's response to the group topic/interactions:  listened actively    Patient appeared to be Attentive.         Client specific details:  Sarah gave appropriate feedback. .

## 2024-02-28 NOTE — GROUP NOTE
Group Therapy Documentation    PATIENT'S NAME: Sarah Keenan  MRN:   [id number]  :   1995  ACCT. NUMBER: [id number]  DATE OF SERVICE: 24  START TIME: 12:30 PM  END TIME:  2:30 PM  FACILITATOR(S): Donald Schwartz LADC  TOPIC: BEH Group Therapy  Number of patients attending the group:  9  Group Length:  2 Hours    Group Therapy Type: Recovery strategies    Summary of Group / Topics Discussed:    Recovery Principles, Mindfulness/Relaxation, Coping/DBT informed care, Trauma informed care, Disease of addiction, and Emotions/expression      Group Attendance:  Attended group session    Patient's response to the group topic/interactions:  cooperative with task    Patient appeared to be Attentive and Engaged.        Client specific details:  Sarah participated in afternoon group. He took part in a discussion on why people chose to use substances after someone dies. And ways that people can cope with death in sobriety. He listened to and gave positive feedback on his peer's assignment. He presented his 25 Sober Coping Skills assignment.

## 2024-02-29 ENCOUNTER — HOSPITAL ENCOUNTER (OUTPATIENT)
Dept: BEHAVIORAL HEALTH | Facility: CLINIC | Age: 29
Discharge: HOME | End: 2024-02-29
Attending: FAMILY MEDICINE
Payer: COMMERCIAL

## 2024-02-29 PROCEDURE — H2035 A/D TX PROGRAM, PER HOUR: HCPCS | Mod: HQ

## 2024-02-29 PROCEDURE — 1002N00001 HC LODGING PLUS FACILITY CHARGE ADULT

## 2024-02-29 NOTE — GROUP NOTE
Psychoeducation Group Documentation    PATIENT'S NAME: Sarah Keenan  MRN:   [id number]  :   1995  ACCT. NUMBER: [id number]  DATE OF SERVICE: 24  START TIME:  3:00 PM  END TIME:  4:00 PM  FACILITATOR(S): Anmol Caraballo LADC; Edna Plasencia LADC  TOPIC: BEH Pyschoeducation  Number of patients attending the group:  22  Group Length:  1 Hours    Skills Group Therapy Type: Recovery skills    Summary of Group / Topics Discussed:    Relapse prevention skills        Group Attendance:  Attended group session    Patient's response to the group topic/interactions:  cooperative with task    Patient appeared to be Attentive and Engaged.         Client specific details:  Pt engaged appropriately with task.

## 2024-02-29 NOTE — GROUP NOTE
Group Therapy Documentation    PATIENT'S NAME: Sarah Keenan  MRN:   [id number]  :   1995  ACCT. NUMBER: [id number]  DATE OF SERVICE: 24  START TIME: 12:30 PM  END TIME:  2:30 PM  FACILITATOR(S): Edna Plasencia LADC; Earlene Quiñones  TOPIC: BEH Group Therapy  Number of patients attending the group:  9  Group Length:  2 Hours    Group Therapy Type: Emotion processing    Summary of Group / Topics Discussed:    Spiritual Care      Group Attendance:  Attended group session    Patient's response to the group topic/interactions:  cooperative with task    Patient appeared to be Actively participating, Attentive, and Engaged.        Client specific details: Sarah was an active participant in spiritual care group session facilitated by Earlene Quiñones.

## 2024-02-29 NOTE — GROUP NOTE
Group Therapy Documentation    PATIENT'S NAME: Sarah Keenan  MRN:   [id number]  :   1995  ACCT. NUMBER: [id number]  DATE OF SERVICE: 24  START TIME:  9:00 AM  END TIME: 11:00 AM  FACILITATOR(S): Bg Martin LADC  TOPIC: BEH Group Therapy  Number of patients attending the group:  9  Group Length:  2 Hours    Group Therapy Type: Recovery strategies and Emotion processing    Summary of Group / Topics Discussed:    Recovery Principles, Sober coping skills, Balanced lifestyle, Disease of addiction, Emotions/expression, and Relapse prevention      Group Attendance:  Attended group session    Patient's response to the group topic/interactions:  cooperative with task, discussed personal experience with topic, expressed readiness to alter behaviors, expressed understanding of topic, gave appropriate feedback to peers, and listened actively    Patient appeared to be Actively participating, Attentive, and Engaged.        Client specific details:  Sarah gave appropriate feedback. .

## 2024-03-01 ENCOUNTER — HOSPITAL ENCOUNTER (OUTPATIENT)
Dept: BEHAVIORAL HEALTH | Facility: CLINIC | Age: 29
Discharge: HOME | End: 2024-03-01
Attending: FAMILY MEDICINE
Payer: COMMERCIAL

## 2024-03-01 PROCEDURE — H2035 A/D TX PROGRAM, PER HOUR: HCPCS | Mod: HQ

## 2024-03-01 PROCEDURE — 1002N00001 HC LODGING PLUS FACILITY CHARGE ADULT

## 2024-03-01 NOTE — GROUP NOTE
Group Therapy Documentation    PATIENT'S NAME: Sarah Keenan  MRN:   [id number]  :   1995  ACCT. NUMBER: [id number]  DATE OF SERVICE: 3/01/24  START TIME:  9:00 AM  END TIME: 11:00 AM  FACILITATOR(S): Bg Martin LADC  TOPIC: BEH Group Therapy  Number of patients attending the group:  9  Group Length:  2 Hours    Group Therapy Type: Recovery strategies and Emotion processing    Summary of Group / Topics Discussed:    Recovery Principles, Sober coping skills, Balanced lifestyle, Disease of addiction, Emotions/expression, and Relapse prevention      Group Attendance:  Attended group session    Patient's response to the group topic/interactions:  cooperative with task, discussed personal experience with topic, expressed readiness to alter behaviors, expressed understanding of topic, gave appropriate feedback to peers, and listened actively    Patient appeared to be Actively participating, Attentive, and Engaged.        Client specific details:  Sarah gave appropriate feedback. .

## 2024-03-01 NOTE — GROUP NOTE
Group Therapy Documentation    PATIENT'S NAME: Sarah Keenan  MRN:   [id number]  :   1995  ACCT. NUMBER: [id number]  DATE OF SERVICE: 3/01/24  START TIME:  9:00 AM  END TIME: 11:00 AM  FACILITATOR(S): Bg Martin LADC  TOPIC: BEH Group Therapy  Number of patients attending the group:  9  Group Length:  2 Hours    Group Therapy Type: Recovery strategies and Emotion processing    Summary of Group / Topics Discussed:    Recovery Principles, Sober coping skills, Balanced lifestyle, Disease of addiction, Emotions/expression, Relapse prevention, and Self-care activities      Group Attendance:  Attended group session    Patient's response to the group topic/interactions:  cooperative with task, discussed personal experience with topic, expressed readiness to alter behaviors, expressed understanding of topic, gave appropriate feedback to peers, and listened actively    Patient appeared to be Actively participating, Attentive, and Engaged.        Client specific details:  Sarah gave appropriate feedback. .

## 2024-03-02 ENCOUNTER — HOSPITAL ENCOUNTER (OUTPATIENT)
Dept: BEHAVIORAL HEALTH | Facility: CLINIC | Age: 29
Discharge: HOME | End: 2024-03-02
Attending: FAMILY MEDICINE
Payer: COMMERCIAL

## 2024-03-02 PROCEDURE — 1002N00001 HC LODGING PLUS FACILITY CHARGE ADULT

## 2024-03-02 PROCEDURE — H2035 A/D TX PROGRAM, PER HOUR: HCPCS | Mod: HQ

## 2024-03-02 NOTE — GROUP NOTE
Group Therapy Documentation    PATIENT'S NAME: Sarah Keenan  MRN:   [id number]  :   1995  ACCT. NUMBER: [id number]  DATE OF SERVICE: 3/02/24  START TIME:  9:45 AM  END TIME: 11:00 AM  FACILITATOR(S): Malorie Gutierrez LADC; Anmol Caraballo LADC  TOPIC: BEH Group Therapy  Number of patients attending the group:  25  Group Length:  1 Hours    Group Therapy Type: Recovery strategies    Summary of Group / Topics Discussed:    Balanced lifestyle and Cognitive behavioral therapy skills      Group Attendance:  Attended group session    Patient's response to the group topic/interactions:  listened actively    Patient appeared to be Attentive and Engaged.        Client specific details:  Pt was attentive and engaged.

## 2024-03-02 NOTE — GROUP NOTE
Group Therapy Documentation    PATIENT'S NAME: Sarah Keenan  MRN:   [id number]  :   1995  ACCT. NUMBER: [id number]  DATE OF SERVICE: 3/02/24  START TIME: 12:30 PM  END TIME:  2:30 PM  FACILITATOR(S): Elsa Sandoval; Malorie Gutierrez LADC  TOPIC: BEH Group Therapy  Number of patients attending the group:  27  Group Length:  2 Hours    Group Therapy Type: Recovery strategies    Summary of Group / Topics Discussed:    Recovery Principles, Sober coping skills, Co-occurring illnesses symptom management, and Relapse prevention      Group Attendance:  Attended group session    Patient's response to the group topic/interactions:  cooperative with task, expressed understanding of topic, gave appropriate feedback to peers, and listened actively    Patient appeared to be Actively participating, Attentive, and Engaged.        Client specific details:  Pt appeared to be engaged in group throughout the duration by participating in discussions and group work. Pt appears to be in the action stage of change by reflecting on past maladaptive behavior and changing behavior moving forward.

## 2024-03-03 ENCOUNTER — HOSPITAL ENCOUNTER (OUTPATIENT)
Dept: BEHAVIORAL HEALTH | Facility: CLINIC | Age: 29
Discharge: HOME | End: 2024-03-03
Attending: FAMILY MEDICINE
Payer: COMMERCIAL

## 2024-03-03 PROCEDURE — 1002N00001 HC LODGING PLUS FACILITY CHARGE ADULT

## 2024-03-03 PROCEDURE — H2035 A/D TX PROGRAM, PER HOUR: HCPCS | Mod: HQ

## 2024-03-03 ASSESSMENT — ANXIETY QUESTIONNAIRES
2. NOT BEING ABLE TO STOP OR CONTROL WORRYING: NOT AT ALL
1. FEELING NERVOUS, ANXIOUS, OR ON EDGE: NOT AT ALL

## 2024-03-03 NOTE — GROUP NOTE
Group Therapy Documentation    PATIENT'S NAME: Sarah Keenan  MRN:   [id number]  :   1995  ACCT. NUMBER: [id number]  DATE OF SERVICE: 3/03/24  START TIME:  8:45 AM  END TIME: 10:30 AM  FACILITATOR(S): Shantell Driscoll RN; [facility]  TOPIC: BEH Group Therapy  Number of patients attending the group:  27  Group Length:  2 Hours    Group Therapy Type: Health and Wellbeing    Summary of Group / Topics Discussed:    Balanced lifestyle and Self-care activities    Group session was facilitated by nursing staff regarding the topics of diet and exercise. Group participants provided feedback  throughout group session.       Group Attendance:  Attended group session    Patient's response to the group topic/interactions:  cooperative with task    Patient appeared to be Actively participating, Attentive, and Engaged.        Client specific details:  Patient actively engaged in group discussion.

## 2024-03-03 NOTE — PROGRESS NOTES
"[facility] Weekly Treatment Plan Review    Date span:  24 to 3-3-24    Patient did have any absences during this time period (list absence dates and reason for absence). He was excused from Skills group on 2-15-24 for an appt.    Treatment Plan initiated on: 24.    Dimension1: Acute Intoxication/Withdrawal Potential -   Previous Dimension Ratin  Current Dimension Ratin  Date of Last Use: 24  Any reports of withdrawal symptoms - No    Dimension 2: Biomedical Conditions & Complications -   Previous Dimension Ratin  Current Dimension Ratin  Medical Concerns:  None reported.  Current Medications & Medication Changes:  No current outpatient medications on file.     No current facility-administered medications for this encounter.     Facility-Administered Medications Ordered in Other Encounters   Medication    Self Administer Medications: Behavioral Services     Medication Prescriber: Patient reported \"Amoxi, from a doctor in the ER.\"  Taking meds as prescribed? Yes  Medication side effects or concerns: None reported.  Outside medical appointments this week (list provider and reason for visit):  None reported    Narrative:Patient reports no biomedical issues or concerns that would interfere with his ability to complete [facility] program. Patient appears fully functioning and able to seek medical attention as needed.     Dimension 3: Emotional/Behavioral Conditions & Complications -   Previous Dimension Ratin  Current Dimension Ratin  PHQ2:       2024    10:00 AM 2024    10:00 AM 2024    12:00 PM   PHQ-2 (  [employer])   Q1: Little interest or pleasure in doing things 0 0 0   Q2: Feeling down, depressed or hopeless 0 0 0   PHQ-2 Score 0 0 0      GAD2:       2024    12:00 PM 2024    10:00 AM 2024    10:00 AM   ED-2   Feeling nervous, anxious, or on edge 0 0 0   Not being able to stop or control worrying 0 0 0   ED-2 Total Score 0 0 0 "     Mental health diagnosis: none  Date of last SIB:  Patient denies.  Date of  last SI:  Patient denies.  Date of last HI: Patient denies.  Behavioral Targets: Follow recommendations of medical provider. Report any alcohol or drug use to counselor and any increase in withdrawal symptoms to nurse. Stabilize and maintain mental health.   Risk factors: The patient lacks relapse prevention, coping, and refusal skills, as well as a sober peer support network. He has a tendency to isolate and a significant history of trauma, guilt, grief, and loss issues. He has current legal issues.  Protective factors:  abstinence from substances, adherence with prescribed medication, agreement to use safety plan, living with other people, structured day, and access to a variety of clinical interventions  Current  Assignments:  none at this time    Narrative: Patient is continuing to work on gaining insight regarding how his substance abuse negatively affects his mental and emotional well being. Patient reports no significant negative changes in his mood and that he's managed stress by praying and breathing exercises. Patient has completed all of his assignments in this dimension. Patient reports no suicidal ideation at this time.    [place] Suicide Severity Rating Scale (Short Version)      2/9/2024    10:00 AM 2/13/2024     1:39 PM   [place] Suicide Severity Rating (Short Version)   Over the past 2 weeks have you felt down, depressed, or hopeless?  no   Over the past 2 weeks have you had thoughts of killing yourself?  no   Have you ever attempted to kill yourself?  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 He appears to be in the contemplative stage of change at this time.  MARLY assignments -  How Substance Use Affects my Behavior,   Benefits and Consequences of Using Alcohol and Drugs     Narrative - Patient is continuing to work on his internal motivation for change. Patient has attended meetings, groups and lectures on time and offers meaningful feedback to his peers regarding home work assignments and topics for group discussion.Patient reports that he is motivated by wanting to improve his health and family. Patient has completed all of his assignments in this dimension.      Dimension 5: Relapse / Continued Problem Potential -   Previous Dimension Ratin  Current Dimension Ratin  Relapses this week - None  Urges to use - None  UA results - negative for all screened drugs    Narrative- Patient reports no cravings this week. Patient reports that he's managed ravings by using positive self talk.Patient is continuing to work on gaining awareness and insight regarding his triggers, cues and early warning signs for relapse. Patient is working on his \"Understanding Cravings and Temptations\" and \"How to Prevent and Manage Cravings and Temptations\" assignments and will present both in group upon completion. Patient attended a \"Relapse Prevention\" workshop and a \"Self Care\" lecture and completed all activities.      Dimension 6: Recovery Environment -   Previous Dimension Rating:  3  Current Dimension Rating:  3  Family Involvement - n/a  Summarize attendance at family groups and family sessions - n/a  Family supportive of treatment?  Yes  Recreational activities - none reported    Narrative - Patient attended all 12 Step meetings this week according to his treatment plan. Patient is working on his sober " support network by is spending free time with same-gender peers. Patient will continue to work with counselors and support staff to develop an aftercare treatment protocol.    Justification for Continued Treatment at this Level of Care: Risk ratings indicate continued need for this level of care. Pt has been unable to maintain abstinence from drugs while at the outpatient level of care, lacks long-term sober maintenance skills, lacks sober coping skills and has mental health concerns which are exacerbated by substance use.   Treatment coordination activities this week:  coordination with  and referrals to [facility] services including Addiction Medicine  Need for peer recovery support referral? No    Discharge Planning:  Target Discharge Date/Timeframe:  3-8-24  Med Mgmt Provider/Appt:  TBD  Ind therapy Provider/Appt:  TBD  Family therapy Provider/Appt:  TBD  Other referrals:  none at this time.    Has vulnerable adult status changed? No    Interdisciplinary Clinical Supervision including: MORENITA, Mental health professional, Medical provider, and RN    Are Treatment Plan goals/objectives effective? Yes  *If no, list changes to treatment plan:    Are the current goals meeting client's needs? Yes  *If no, list the changes to treatment plan.    Patient Input / Response: Pt contributed to treatment plan review.    *Client agrees with any changes to the treatment plan: N/A  *Client received copy of changes: N/A  *Client is aware of right to access a treatment plan review: Yes    MORENITA Wesley

## 2024-03-03 NOTE — GROUP NOTE
Group Therapy Documentation    PATIENT'S NAME: Sarah Keenan  MRN:   [id number]  :   1995  ACCT. NUMBER: [id number]  DATE OF SERVICE: 3/03/24  START TIME: 12:30 PM  END TIME:  1:30 PM  FACILITATOR(S): Malorie Gutierrez LADC  TOPIC: BEH Group Therapy  Number of patients attending the group:  26    Group Length:  1 Hour    Group Therapy Type: Recovery strategies and Emotion processing    Summary of Group / Topics Discussed:    Mindfulness/Relaxation and Emotions/expression      Group Attendance:  Attended group session    Patient's response to the group topic/interactions:  cooperative with task    Patient appeared to be Actively participating, Attentive, and Engaged.        Client specific details:  Patient attended group lecture and was attentive and participative.

## 2024-03-04 ENCOUNTER — HOSPITAL ENCOUNTER (OUTPATIENT)
Dept: BEHAVIORAL HEALTH | Facility: CLINIC | Age: 29
Discharge: HOME | End: 2024-03-04
Attending: FAMILY MEDICINE
Payer: COMMERCIAL

## 2024-03-04 PROCEDURE — 1002N00001 HC LODGING PLUS FACILITY CHARGE ADULT

## 2024-03-04 PROCEDURE — H2035 A/D TX PROGRAM, PER HOUR: HCPCS | Mod: HQ

## 2024-03-04 NOTE — GROUP NOTE
Group Therapy Documentation    PATIENT'S NAME: Sarah Keenan  MRN:   [id number]  :   1995  ACCT. NUMBER: [id number]  DATE OF SERVICE: 3/04/24  START TIME:  9:00 AM  END TIME: 11:00 AM  FACILITATOR(S): Bg Martin LADC  TOPIC: BEH Group Therapy  Number of patients attending the group:  10  Group Length:  2 Hours    Group Therapy Type: Recovery strategies and Emotion processing    Summary of Group / Topics Discussed:    Recovery Principles, Sober coping skills, Balanced lifestyle, Disease of addiction, Emotions/expression, and Relapse prevention      Group Attendance:  Attended group session    Patient's response to the group topic/interactions:  cooperative with task, discussed personal experience with topic, expressed readiness to alter behaviors, expressed understanding of topic, gave appropriate feedback to peers, and listened actively    Patient appeared to be Actively participating, Attentive, and Engaged.        Client specific details:  Sarah gave appropriate feedback. .

## 2024-03-04 NOTE — GROUP NOTE
Group Therapy Documentation    PATIENT'S NAME: Sarah Keenan  MRN:   [id number]  :   1995  ACCT. NUMBER: [id number]  DATE OF SERVICE: 3/04/24  START TIME: 12:30 PM  END TIME:  2:30 PM  FACILITATOR(S): Edna Plasencia LADC  TOPIC: BEH Group Therapy  Number of patients attending the group:  10  Group Length:  2 Hours    Group Therapy Type: Emotion processing    Summary of Group / Topics Discussed:    Sober coping skills, Disease of addiction, and Emotions/expression      Group Attendance:  Attended group session    Patient's response to the group topic/interactions:  cooperative with task    Patient appeared to be Actively participating, Attentive, and Engaged.        Client specific details: Sarah was an active participant in afternoon group therapy session.

## 2024-03-05 ENCOUNTER — HOSPITAL ENCOUNTER (OUTPATIENT)
Dept: BEHAVIORAL HEALTH | Facility: CLINIC | Age: 29
Discharge: HOME | End: 2024-03-05
Attending: FAMILY MEDICINE
Payer: COMMERCIAL

## 2024-03-05 PROCEDURE — H2035 A/D TX PROGRAM, PER HOUR: HCPCS | Mod: HQ

## 2024-03-05 PROCEDURE — 1002N00001 HC LODGING PLUS FACILITY CHARGE ADULT

## 2024-03-05 NOTE — GROUP NOTE
Group Therapy Documentation    PATIENT'S NAME: Sarah Keenan  MRN:   [id number]  :   1995  ACCT. NUMBER: [id number]  DATE OF SERVICE: 3/05/24  START TIME:  3:00 PM  END TIME:  4:00 PM  FACILITATOR(S): Mariola Starks LADC; Donald Schwartz LADC; Malorie Gutierrez LADC  TOPIC: BEH Group Therapy  Number of patients attending the group:  24  Group Length:  2 Hours    Group Therapy Type: Recovery strategies    Summary of Group / Topics Discussed:    Disease of addiction    Guest Lecturer Dr. Sanchez presented a 60 minute presentation on Addiction Research. Exploring ( What is research? What's the point of research in the Addiction Field? What are the benefits? How long does it take? What is the overall goal of Addiction Study Research.) Patients were allowed to asked questions and process the presentation with peers and staff.        Group Attendance:  Attended group session    Patient's response to the group topic/interactions:  cooperative with task    Patient appeared to be Actively participating.        Client specific details:  Sarah attended afternoon skills group and participated in processing discussion. Patient remained engaged and participated throughout group session.       MORENITA Valentin

## 2024-03-05 NOTE — GROUP NOTE
Group Therapy Documentation    PATIENT'S NAME: Sarah Keenan  MRN:   [id number]  :   1995  ACCT. NUMBER: [id number]  DATE OF SERVICE: 3/05/24  START TIME:  9:00 AM  END TIME: 11:00 AM  FACILITATOR(S): Bg Martin LADC  TOPIC: BEH Group Therapy  Number of patients attending the group:  10  Group Length:  2 Hours    Group Therapy Type: Recovery strategies and Emotion processing    Summary of Group / Topics Discussed:    Sober coping skills, Balanced lifestyle, Disease of addiction, Emotions/expression, Relapse prevention, and Self-care activities      Group Attendance:  Attended group session    Patient's response to the group topic/interactions:  cooperative with task, discussed personal experience with topic, expressed readiness to alter behaviors, expressed understanding of topic, gave appropriate feedback to peers, and listened actively    Patient appeared to be Actively participating, Attentive, and Engaged.        Client specific details:  Sarah gave appropriate feedback. .

## 2024-03-05 NOTE — GROUP NOTE
Group Therapy Documentation    PATIENT'S NAME: Sarah Keenan  MRN:   [id number]  :   1995  ACCT. NUMBER: [id number]  DATE OF SERVICE: 3/05/24  START TIME: 12:30 PM  END TIME:  2:30 PM  FACILITATOR(S): Malorie Gutierrez LADC  TOPIC: BEH Group Therapy  Number of patients attending the group:  10    Group Length:  2 Hours    Group Therapy Type: Recovery strategies, Emotion processing, and Daily living/independence skills    Summary of Group / Topics Discussed:    Sober coping skills, Relationship/socialization, Balanced lifestyle, and Relapse prevention      Group Attendance:  Attended group session    Patient's response to the group topic/interactions:  cooperative with task    Patient appeared to be Actively participating, Attentive, and Engaged.        Client specific details: Patient attended group session and was attentive and participative.

## 2024-03-06 ENCOUNTER — HOSPITAL ENCOUNTER (OUTPATIENT)
Dept: BEHAVIORAL HEALTH | Facility: CLINIC | Age: 29
Discharge: HOME | End: 2024-03-06
Attending: FAMILY MEDICINE
Payer: COMMERCIAL

## 2024-03-06 PROCEDURE — 1002N00001 HC LODGING PLUS FACILITY CHARGE ADULT

## 2024-03-06 PROCEDURE — H2035 A/D TX PROGRAM, PER HOUR: HCPCS | Mod: HQ

## 2024-03-06 NOTE — GROUP NOTE
Group Therapy Documentation    PATIENT'S NAME: Sarah Keenan  MRN:   [id number]  :   1995  ACCT. NUMBER: [id number]  DATE OF SERVICE: 3/06/24  START TIME: 12:30 PM  END TIME:  2:30 PM  FACILITATOR(S): Edna Plasencia LADC  TOPIC: BEH Group Therapy  Number of patients attending the group:  9  Group Length:  2 Hours    Group Therapy Type: Emotion processing    Summary of Group / Topics Discussed:    Sober coping skills, Co-occurring illnesses symptom management, and Disease of addiction      Group Attendance:  Attended group session    Patient's response to the group topic/interactions:  cooperative with task    Patient appeared to be Actively participating, Attentive, and Engaged.        Client specific details: Sarah was an active participant in afternoon group therapy session.

## 2024-03-06 NOTE — GROUP NOTE
Psychoeducation Group Documentation    PATIENT'S NAME: Sarah Keenan  MRN:   [id number]  :   1995  ACCT. NUMBER: [id number]  DATE OF SERVICE: 3/06/24  START TIME:  8:30 AM  END TIME:  9:30 AM  FACILITATOR(S): Bg Martin LADC; Shirley Walker LADC; Linus Marcelino LADC  TOPIC: BEH Pyschoeducation  Number of patients attending the group:  27  Group Length:  1 Hours    Skills Group Therapy Type: Recovery skills and Emotion regulation skills    Summary of Group / Topics Discussed:    Relationship/social skills and Balanced lifestyle skills        Group Attendance:  Attended group session    Patient's response to the group topic/interactions:  listened actively    Patient appeared to be Attentive.         Client specific details:  Sarah gave appropriate feedback. .

## 2024-03-06 NOTE — GROUP NOTE
Group Therapy Documentation    PATIENT'S NAME: Sarah Keenan  MRN:   [id number]  :   1995  ACCT. NUMBER: [id number]  DATE OF SERVICE: 3/06/24  START TIME:  9:40 AM  END TIME: 11:20 AM  FACILITATOR(S): Bg Martin LADC  TOPIC: BEH Group Therapy  Number of patients attending the group:  10  Group Length:  2 Hours    Group Therapy Type: Recovery strategies and Emotion processing    Summary of Group / Topics Discussed:    Recovery Principles, Relationship/socialization, Balanced lifestyle, Disease of addiction, Emotions/expression, Relapse prevention, and Self-care activities      Group Attendance:  Attended group session    Patient's response to the group topic/interactions:  cooperative with task, discussed personal experience with topic, expressed readiness to alter behaviors, expressed understanding of topic, gave appropriate feedback to peers, and listened actively    Patient appeared to be Actively participating, Attentive, and Engaged.        Client specific details:  Sarah gave appropriate feedback. .

## 2024-03-07 ENCOUNTER — HOSPITAL ENCOUNTER (OUTPATIENT)
Dept: BEHAVIORAL HEALTH | Facility: CLINIC | Age: 29
Discharge: HOME | End: 2024-03-07
Attending: FAMILY MEDICINE
Payer: COMMERCIAL

## 2024-03-07 PROCEDURE — 1002N00001 HC LODGING PLUS FACILITY CHARGE ADULT

## 2024-03-07 PROCEDURE — H2035 A/D TX PROGRAM, PER HOUR: HCPCS | Mod: HQ

## 2024-03-07 NOTE — GROUP NOTE
"Group Therapy Documentation    PATIENT'S NAME: Sarah Keenan  MRN:   [id number]  :   1995  ACCT. NUMBER: [id number]  DATE OF SERVICE: 3/07/24  START TIME:  9:00 AM  END TIME: 11:00 AM  FACILITATOR(S): Bg Martin LADC  TOPIC: BEH Group Therapy  Number of patients attending the group:  9  Group Length:  2 Hours    Group Therapy Type: Recovery strategies and Emotion processing    Summary of Group / Topics Discussed:    Recovery Principles, Sober coping skills, Relationship/socialization, Balanced lifestyle, Disease of addiction, Emotions/expression, and Relapse prevention      Group Attendance:  Attended group session    Patient's response to the group topic/interactions:  cooperative with task, discussed personal experience with topic, expressed readiness to alter behaviors, expressed understanding of topic, gave appropriate feedback to peers, and listened actively    Patient appeared to be Actively participating, Attentive, and Engaged.        Client specific details:  Sarah gave appropriate feedback. .He presented  his \"Who Do I Want To Be\" assignment.     "

## 2024-03-07 NOTE — PROGRESS NOTES
[facility]    Patient:  Sarah Keenan    MRN: [id number]  : 1995 Age: 28 year old Sex: male   -   Focus of Treatment / Discharge Recommendations   Personal Safety/ Management of Symptoms   * Follow your safety plan. Report increased symptoms to your care team and /or go to the nearest Emergency Department.   * Call crisis lines as needed   [facility] [phone] [facility] [phone]   [facility] [phone] Crisis Connection [phone]   [facility] [phone] [facility] [phone]   [facility] [phone] National Suicide Prevention [phone]   [facility] [phone] Suicide Prevention [phone]   [facility] [phone]   Abstinence/Relapse Prevention   * Take all medicines as directed. Carry a current list of medicines with you.   * Use coping skills: Use peer support group,continue to attend 12 step meetings.Obtain and work with sponsor.   * Do not use illicit (street) drugs, controlled substances (narcotics) or alcohol.   Develop/Improve Independent Living/Socialization Skills: Continue to build relationship with family and sober support network.  Community Resources/Supports and Discharge Planning: Attend three 12 step meetings per week.Obtain sponsor.Attend [facility] [place] treatment program.  Follow up with psychiatrist / main caregiver: TBD Next visit: TBD   Follow up with your therapist: N/A Next visit: N/A   Go to group therapy and / or support groups at: [facility] [place] treatment and 12 step meetings by your home.   See your medical doctor about: N/A   Other:   Client Signature:_______________________ Date / Time:___________   Staff Signature:________________________ Date / Time:___________

## 2024-03-07 NOTE — GROUP NOTE
Group Therapy Documentation    PATIENT'S NAME: Sarah Keenan  MRN:   [id number]  :   1995  ACCT. NUMBER: [id number]  DATE OF SERVICE: 3/07/24  START TIME:  3:00 PM  END TIME:  4:00 PM  FACILITATOR(S): Edna Plasencia LADC; John Prado LADC; Tobi Goldstein LADC  TOPIC: BEH Group Therapy  Number of patients attending the group:  25  Group Length:  1 Hours    Group Therapy Type: Recovery strategies    Summary of Group / Topics Discussed:    Recovery Principles, Sober coping skills, and Leisure explorations/use of leisure time      Group Attendance:  Attended group session    Patient's response to the group topic/interactions:  cooperative with task    Patient appeared to be Actively participating, Attentive, and Engaged.        Client specific details: Sarah was an active participant in skills group activity. Participants practiced communication and teamwork skills to complete a challenging task together.

## 2024-03-07 NOTE — PROGRESS NOTES
[address] [place] [address]        Sarah Keenan, 1995, was admitted for evaluation/treatment of a Substance Use Disorder at [facility].  This person took part in these program(s):    ______ The Inpatient Program   ______ The Outpatient Program   ___X___ [facility]   ______ [facility] Outpatient       Date admitted: 2/9/24  Date discharged: 3/8/24    Type of discharge:   ___X___ Satisfactory - completed evaluation / treatment   ______ Discharged without completing   ______ Behavioral discharge   ______ Transferred to another chemical dependency program   ______ Transferred to another type of service   ______ Left against medical advice (AMA) / Eloped       Comments:       Counselor: MORENITA Freitas                       Date: 3/7/2024             Time: 7:20 AM

## 2024-03-07 NOTE — GROUP NOTE
Group Therapy Documentation    PATIENT'S NAME: Sarah Keenan  MRN:   [id number]  :   1995  ACCT. NUMBER: [id number]  DATE OF SERVICE: 3/07/24  START TIME: 12:30 PM  END TIME:  2:30 PM  FACILITATOR(S): Edna Plasencia LADC; Earlene Quiñones  TOPIC: BEH Group Therapy  Number of patients attending the group:  8  Group Length:  2 Hours    Group Therapy Type: Recovery strategies    Summary of Group / Topics Discussed:    Spiritual Care      Group Attendance:  Attended group session    Patient's response to the group topic/interactions:  cooperative with task    Patient appeared to be Actively participating, Attentive, and Engaged.        Client specific details: Sarah was an active participant in spiritual care group session facilitated by Earlene Quiñones.

## 2024-03-08 NOTE — ADDENDUM NOTE
Encounter addended by: Edna Plasencia LADC on: 3/8/2024 12:18 PM   Actions taken: Clinical Note Signed

## 2024-03-08 NOTE — PROGRESS NOTES
COUNSELOR S DISCHARGE SUMMARY    Date: 3/8/2024    Program Name:  [facility]    Client Name: Sarah Keenan YOB: 1995      MR#:  [id number]    Referred by: Self       [facility] copies [facility]: [facility] (Fax: [phone])      Admit date: 24  Discharge Date:  3/8/24    # of Days Attended: 28  Date Last Attended: 3/7/24     Discharge Status:  With Staff Approval    PROBLEMS PRESENTED AT ADMISSION:  (Include reasons & circumstances for admission)  Sarah Keenan is a 28 year old year old male who was referred to  from [facility] outpatient services, as he continued to have positive UA results while in programming. Staff at the [facility] determined he required a higher level of care.    Admitting diagnosis:   Cannabis Use Disorder Severe - 304.30 (F12.20)  Amphetamine Use Disorder Severe - 304.40 (F15.20)      PROGRAM PARTICIPATION:  While at [facility], Sarah Keenan received the following services to address substance use and mental health disorders.  he participated in:  Group Therapy, Recreation Activities, Spirituality Groups, DBT Skills Groups, AA/NA meetings , Life Skills Groups, and Psych-education Groups      PROGRESS:     Dimension 1 - Acute Intoxication/Withdrawal Potential:  Admission ASAM Risk Ratin Client displays full functioning with good ability to tolerate and cope with withdrawal discomfort. No signs or symptoms of intoxication or withdrawal or resolving signs or symptoms.  Discharge ASAM Risk Ratin Client displays full functioning with good ability to tolerate and cope with withdrawal discomfort. No signs or symptoms of intoxication or withdrawal or resolving signs or symptoms.    Treatment goal(s):  Develop effective strategies to maintain sobriety.      Progress toward goal(s):  Patient reports last use date was 24. Patient was able to tolerate mild withdrawal symptoms throughout treatment and participate in  "programming. No concerns at time of discharge.     Dimension 2 - Biomedical Conditions & Complications:  Admission ASAM Risk Ratin Client displays full functioning with good ability to cope with physical discomfort.  Discharge ASAM Risk Ratin Client displays full functioning with good ability to cope with physical discomfort.    Treatment goal(s):  Obtain a medical evaluation and follow recommendations of medical provider.     Progress toward goal(s): Patient denies any biomedical concerns. Patient has a primary care provider at [facility] and has follow-up appointments scheduled for after discharge. Patient maintained medication compliance and appears able to access medical aid independently.      Dimension 3 - Emotional/Behavioral Conditions & Complications:  Admission ASAM Risk Ratin Client has impulse control and coping skills. Client presents a mild to moderate risk of harm to self or others or displays symptoms of emotional, behavioral or cognitive problems. Client has a mental health diagnosis and is stable. Client functions adequately in significant life areas.  Discharge ASAM Risk Ratin Client has impulse control and coping skills. Client presents a mild to moderate risk of harm to self or others or displays symptoms of emotional, behavioral or cognitive problems. Client has a mental health diagnosis and is stable. Client functions adequately in significant life areas.    Treatment goal(s):  Learn skills to express emotions in a healthy and appropriate way.     Progress toward goal(s):  Patient denied any mental health diagnoses. Upon admission, patient's PHQ-9 initial score was 0/27, indicating no depression.  Patient's ED-7 initial score was 0/21, indicating minimal anxiety. Patient participated in a suicide risk screening as part of the CD assessment and was given a rating of \"Very Low Risk\".  Patient completed a safety plan upon entering the [facility]. Upon " "discharge, patient denied any suicidal thoughts or ideations.    Dimension 4 - Treatment Acceptance/Resistance:  Admission ASAM Risk Rating: 3 Client displays inconsistent compliance, minimal awareness of either the client's addiction or mental disorder, and is minimally cooperative.  Discharge ASAM Risk Ratin Client is motivated with active reinforcement, to explore treatment and strategies for change, but ambivalent about illness or need for change.    Treatment goal(s):  Understand the impact your substance use has had on you.     Progress toward goal(s):  Upon admission, patient had limited insight or acceptance of the first step and how his substance use has affected his life. [facility], patient increased his internal motivation for change and made great progress in this area. To accomplish his above stated goals, patient completed the assignments \"How Substance Use Affects my Behavior\", \"Benefits and Consequences of Using Alcohol and Drugs\", and \"Drug Use History\".  He appears the be in the contemplation stage of change at this time. His risk rating in this dimension has been lowered from a \"3\" to a \"1\".    Dimension 5 - Relapse/Continued Problem Potential:  Admission ASAM Risk Ratin No awareness of the negative impact of mental health problems or substance abuse. No coping skills to arrest mental health or addiction illnesses, or prevent relapse.  Discharge ASAM Risk Rating: 3 Client has poor recognition and understanding of relapse and recidivism issues and displays moderately high vulnerability for further substance use or mental health problems. Client has few coping skills and rarely applies coping skills.    Treatment goal(s):  Identify personal triggers and relapse warning signs. Identify and understand personal relapse and self-sabotage patterns. Develop sober coping and living skills.     Progress toward goal(s):  Upon admission, patient had limited insight into his personal " "relapse process, triggers, and warning signs. He had minimal periods of sobriety outside of a structured setting and lacked sober coping skills. To accomplish his above stated goals, patient completed the assignments \"Understanding Cravings and Temptations\", \"How to Prevent and Manage Cravings and Temptations\", \"Looking Back and Reviewing our Life Experiences\", \"Understanding and Avoiding Relapse\", and \"25 Sober coping Skills\". Patient was able to verbalize sober coping skills and relapse prevention strategies prior to discharge. His risk rating in this dimension has been lowered from a \"4\" to a \"3\".    Dimension 6 - Recovery Environment: (family, recreation, legal, education, etc.)  Admission ASAM Risk Rating: 3 Client is not engaged in structured, meaningful activity and the client's peers, family, significant other, and living environment are unsupportive, or there is significant criminal justice system involvement.  Discharge ASAM Risk Rating: 3 Client is not engaged in structured, meaningful activity and the client's peers, family, significant other, and living environment are unsupportive, or there is significant criminal justice system involvement.    Treatment goal(s): Comply with recommendations of probation. Develop a sober support network.     Progress toward goal(s):  Patient developed healthy relationships with his peers while in treatment and participated in nightly sober support meetings. Prior to discharge, patient identified supportive resources in the community that include IOP and peer support.        Client strengths identified during treatment were: Patient is kind, hardworking, motivated, insightful, and connects easily with others. He put great effort into all of his assignments and showed tremendous growth in his motivation for treatment. Patient has a strong connection to his spirituality, which will help him in recovery.    Client needs identified during treatment were: Patient struggles " with setting boundaries with friends who are poor influences. Patient has legal troubles that have caused stress, negatively impacting his substance use.      Discharge Diagnosis:    Cannabis Use Disorder Severe - 304.30 (F12.20)  Amphetamine Use Disorder Severe - 304.40 (F15.20)    Discharge Medications:   No current outpatient medications on file.     No current facility-administered medications for this encounter.     Facility-Administered Medications Ordered in Other Encounters   Medication    Self Administer Medications: Behavioral Services       Discharge Plan and Recommendations:    Living arrangements at discharge: Patient is returning to a family friend's residence. [facility] is helping client explore sober living options to transfer to at a later date.   Patient terminated services due to program completion. The following continued care recommendations have been made: [facility]    1.  Abstain from all mood-altering chemicals.   2.  Attend a minimum of one 12-step meeting per week.   3.  Obtain a sponsor and maintain regular contact with him.   4.  Complete aftercare at [facility] and follow all recommendations.  5.  Monitor and comply with advice of doctors regarding physical health issues and take all medications as prescribed.   6.  Continue to invest in building a sober support network.   7.  Continue to monitor and gain understanding of relapse triggers and stressors through the use of development of healthy coping skills.   8.  Comply with all requirements and recommendations of probation.    Prognosis:  Good        Staff Signature: MORENITA Kang, LPCC

## 2024-03-12 ENCOUNTER — OFFICE VISIT (OUTPATIENT)
Dept: FAMILY MEDICINE | Facility: CLINIC | Age: 29
End: 2024-03-12
Payer: COMMERCIAL

## 2024-03-12 VITALS
DIASTOLIC BLOOD PRESSURE: 78 MMHG | SYSTOLIC BLOOD PRESSURE: 114 MMHG | WEIGHT: 133 LBS | TEMPERATURE: 98.1 F | RESPIRATION RATE: 12 BRPM | BODY MASS INDEX: 24.48 KG/M2 | OXYGEN SATURATION: 98 % | HEART RATE: 78 BPM | HEIGHT: 62 IN

## 2024-03-12 DIAGNOSIS — Z78.9 HISTORY OF INCARCERATION: ICD-10-CM

## 2024-03-12 DIAGNOSIS — Z59.71 INSURANCE COVERAGE PROBLEMS: ICD-10-CM

## 2024-03-12 DIAGNOSIS — F15.20 SEVERE STIMULANT USE DISORDER (H): ICD-10-CM

## 2024-03-12 DIAGNOSIS — Z13.220 SCREENING FOR HYPERLIPIDEMIA: ICD-10-CM

## 2024-03-12 DIAGNOSIS — Z11.1 SCREENING EXAMINATION FOR PULMONARY TUBERCULOSIS: ICD-10-CM

## 2024-03-12 DIAGNOSIS — R74.01 TRANSAMINITIS: ICD-10-CM

## 2024-03-12 DIAGNOSIS — F17.200 NICOTINE DEPENDENCE, UNCOMPLICATED, UNSPECIFIED NICOTINE PRODUCT TYPE: ICD-10-CM

## 2024-03-12 DIAGNOSIS — Z11.59 NEED FOR HEPATITIS C SCREENING TEST: ICD-10-CM

## 2024-03-12 DIAGNOSIS — Z11.4 SCREENING FOR HIV (HUMAN IMMUNODEFICIENCY VIRUS): ICD-10-CM

## 2024-03-12 DIAGNOSIS — Z13.1 SCREENING FOR DIABETES MELLITUS: ICD-10-CM

## 2024-03-12 DIAGNOSIS — Z00.00 ROUTINE GENERAL MEDICAL EXAMINATION AT A HEALTH CARE FACILITY: Primary | ICD-10-CM

## 2024-03-12 LAB — HBA1C MFR BLD: 5.2 % (ref 0–5.6)

## 2024-03-12 PROCEDURE — 86481 TB AG RESPONSE T-CELL SUSP: CPT | Performed by: FAMILY MEDICINE

## 2024-03-12 PROCEDURE — 86708 HEPATITIS A ANTIBODY: CPT | Performed by: FAMILY MEDICINE

## 2024-03-12 PROCEDURE — 83036 HEMOGLOBIN GLYCOSYLATED A1C: CPT | Performed by: FAMILY MEDICINE

## 2024-03-12 PROCEDURE — 99395 PREV VISIT EST AGE 18-39: CPT | Mod: 25 | Performed by: FAMILY MEDICINE

## 2024-03-12 PROCEDURE — 86803 HEPATITIS C AB TEST: CPT | Performed by: FAMILY MEDICINE

## 2024-03-12 PROCEDURE — 80076 HEPATIC FUNCTION PANEL: CPT | Performed by: FAMILY MEDICINE

## 2024-03-12 PROCEDURE — 90471 IMMUNIZATION ADMIN: CPT | Performed by: FAMILY MEDICINE

## 2024-03-12 PROCEDURE — 99213 OFFICE O/P EST LOW 20 MIN: CPT | Mod: 25 | Performed by: FAMILY MEDICINE

## 2024-03-12 PROCEDURE — 80061 LIPID PANEL: CPT | Performed by: FAMILY MEDICINE

## 2024-03-12 PROCEDURE — 87389 HIV-1 AG W/HIV-1&-2 AB AG IA: CPT | Performed by: FAMILY MEDICINE

## 2024-03-12 PROCEDURE — 90677 PCV20 VACCINE IM: CPT | Performed by: FAMILY MEDICINE

## 2024-03-12 PROCEDURE — 90746 HEPB VACCINE 3 DOSE ADULT IM: CPT | Performed by: FAMILY MEDICINE

## 2024-03-12 PROCEDURE — 90472 IMMUNIZATION ADMIN EACH ADD: CPT | Performed by: FAMILY MEDICINE

## 2024-03-12 PROCEDURE — 36415 COLL VENOUS BLD VENIPUNCTURE: CPT | Performed by: FAMILY MEDICINE

## 2024-03-12 NOTE — PATIENT INSTRUCTIONS
Preventive Care Advice   This is general advice given by our system to help you stay healthy. However, your care team may have specific advice just for you. Please talk to your care team about your preventive care needs.  Nutrition  Eat 5 or more servings of fruits and vegetables each day.  Try wheat bread, brown rice and whole grain pasta (instead of white bread, rice, and pasta).  Get enough calcium and vitamin D. Check the label on foods and aim for 100% of the RDA (recommended daily allowance).  Lifestyle  Exercise at least 150 minutes each week   (30 minutes a day, 5 days a week).  Do muscle strengthening activities 2 days a week. These help control your weight and prevent disease.  No smoking.  Wear sunscreen to prevent skin cancer.  Have a dental exam and cleaning every 6 months.  Yearly exams  See your health care team every year to talk about:  Any changes in your health.  Any medicines your care team has prescribed.  Preventive care, family planning, and ways to prevent chronic diseases.  Shots (vaccines)   HPV shots (up to age 26), if you've never had them before.  Hepatitis B shots (up to age 59), if you've never had them before.  COVID-19 shot: Get this shot when it's due.  Flu shot: Get a flu shot every year.  Tetanus shot: Get a tetanus shot every 10 years.  Pneumococcal, hepatitis A, and RSV shots: Ask your care team if you need these based on your risk.  Shingles shot (for age 50 and up).  General health tests  Diabetes screening:  Starting at age 35, Get screened for diabetes at least every 3 years.  If you are younger than age 35, ask your care team if you should be screened for diabetes.  Cholesterol test: At age 39, start having a cholesterol test every 5 years, or more often if advised.  Bone density scan (DEXA): At age 50, ask your care team if you should have this scan for osteoporosis (brittle bones).  Hepatitis C: Get tested at least once in your life.  STIs (sexually transmitted  infections)  Before age 24: Ask your care team if you should be screened for STIs.  After age 24: Get screened for STIs if you're at risk. You are at risk for STIs (including HIV) if:  You are sexually active with more than one person.  You don't use condoms every time.  You or a partner was diagnosed with a sexually transmitted infection.  If you are at risk for HIV, ask about PrEP medicine to prevent HIV.  Get tested for HIV at least once in your life, whether you are at risk for HIV or not.  Cancer screening tests  Cervical cancer screening: If you have a cervix, begin getting regular cervical cancer screening tests at age 21. Most people who have regular screenings with normal results can stop after age 65. Talk about this with your provider.  Breast cancer scan (mammogram): If you've ever had breasts, begin having regular mammograms starting at age 40. This is a scan to check for breast cancer.  Colon cancer screening: It is important to start screening for colon cancer at age 45.  Have a colonoscopy test every 10 years (or more often if you're at risk) Or, ask your provider about stool tests like a FIT test every year or Cologuard test every 3 years.  To learn more about your testing options, visit: https://www.[email or web address]/379050.pdf.  For help making a decision, visit: https://bit.ly/sq01579.  Prostate cancer screening test: If you have a prostate and are age 55 to 69, ask your provider if you would benefit from a yearly prostate cancer screening test.  Lung cancer screening: If you are a current or former smoker age 50 to 80, ask your care team if ongoing lung cancer screenings are right for you.  For informational purposes only. Not to replace the advice of your health care provider. Copyright   2023 [employer]. All rights reserved. Clinically reviewed by the [facility] Transitions Program. [employer] 222640 - REV 01/24.

## 2024-03-12 NOTE — PROGRESS NOTES
Preventive Care Visit  [facility] BETTY Dorantes MD, Family Medicine  Mar 12, 2024    Assessment & Plan     Routine general medical examination at a health care facility  28-year-old male with history of methamphetamine use disorder, smoking, and incarceration presenting to establish care and for annual preventive visit. Labs, screenings, and vaccines as ordered and counseling as detailed below.  Counseled on risks and benefits of vaccination and my recommendation for vaccination; patient's informed decision was to decline flu, covid, and Tdap vaccine today. Informed patient of how to obtain in future if they desire.  - Pneumococcal 20 Valent Conjugate (Prevnar 20)  - HEPATITIS B, ADULT 20+ (ENGERIX-B/RECOMBIVAX HB)    Nicotine dependence, uncomplicated, unspecified nicotine product type  He has been smoking half a pack a day for 10 years.  He is not interested in quitting right now.    Severe stimulant use disorder (H)  He was previously using methamphetamines.  Last use was in February.  He has been seen by addiction medicine.  He was in inpatient program and is now in an outpatient program that he attends 4 days a week.  He is receiving significant support through this.  He denies current cravings.  He will be in the outpatient program for 3 more months.    Screening for HIV (human immunodeficiency virus)  Agrees to screening.  - HIV Antigen Antibody Combo; Future  - HIV Antigen Antibody Combo    Need for hepatitis C screening test  Agrees to screening.  - Hepatitis C Screen Reflex to HCV RNA Quant and Genotype; Future  - Hepatitis C Screen Reflex to HCV RNA Quant and Genotype    Transaminitis  Had mildly elevated LFTs a few months ago when they were checked in the emergency room.  Recheck today.  - Hepatic panel (Albumin, ALT, AST, Bili, Alk Phos, TP); Future  - Hepatitis A Antibody Total; Future  - Hepatic panel (Albumin, ALT, AST, Bili, Alk Phos, TP)  - Hepatitis A Antibody  Total    Insurance coverage problems  He reports that he needs a new insurance card.  - Primary Care - Care Coordination Referral; Future    Screening for hyperlipidemia  Given age and BMI check lipids.  - Lipid panel reflex to direct LDL Non-fasting; Future  - Lipid panel reflex to direct LDL Non-fasting    Screening for diabetes mellitus  Given Bmi and race check for diabetes.  - Hemoglobin A1c; Future  - Hemoglobin A1c    Screening examination for pulmonary tuberculosis  Due to history of incarceration.  - Quantiferon TB Gold Plus; Future  - Quantiferon TB Gold Plus    History of incarceration  Check labs as ordered. He is currently on probation.    Patient has been advised of split billing requirements and indicates understanding: Yes          Nicotine/Tobacco Cessation  He reports that he has been smoking cigarettes. He has a 5 pack-year smoking history. He does not have any smokeless tobacco history on file.  Nicotine/Tobacco Cessation Plan  Information offered: Patient not interested at this time      Counseling  Appropriate preventive services were discussed with this patient, including applicable screening as appropriate for fall prevention, nutrition, physical activity, Tobacco-use cessation, weight loss and cognition.  Checklist reviewing preventive services available has been given to the patient.  Reviewed patient's diet, addressing concerns and/or questions.   The patient was instructed to see the dentist every 6 months.       Follow up in 6 months.    Cary Smith is a 28 year old, presenting for the following:  Physical        3/12/2024     1:27 PM   Additional Questions   Roomed by marisa montano MA   Accompanied by self        Health Care Directive  Patient does not have a Health Care Directive or Living Will: Discussed advance care planning with patient; information given to patient to review.    HPI    Family history limited - parents, siblings are in [facility] - has not been with them  since he was young    Has 3 children (sons) with his ex-wife, 4y after arrived to [place] they ; has not seen kids for a little bit over 2y, thinks they are doing fine; would love to see them when he is able to stand on own two feet    Graduated from substance use treatment program Friday  Currently living with friend, still going to treatment at [facility] for 3mos, [facility] for 2h each day; feels it is very helpful - getting back on feet  Not working due to not having green card; is getting help from program for green card and other things to prepare him to work    Incarcerated previously, 2023 - mandated treatment, probation now    No depression or sadness                3/12/2024   General Health   How would you rate your overall physical health? Good         3/12/2024   Nutrition   Three or more servings of calcium each day? Yes   Diet: Regular (no restrictions)   How many servings of fruit and vegetables per day? (!) 0-1   How many sweetened beverages each day? 0-1          No data to display                  3/12/2024   Social Factors   Worry food won't last until get money to buy more No   Food not last or not have enough money for food? No   Do you have housing?  Yes   Are you worried about losing your housing? No   Lack of transportation? Yes   Unable to get utilities (heat,electricity)? No    (!) TRANSPORTATION CONCERN PRESENT      3/12/2024   Dental   Dentist two times every year? (!) NO         3/12/2024   TB Screening   Were you born outside of [place]?  (!) YES           Today's PHQ-2 Score:       3/12/2024     1:26 PM   PHQ-2 ( 1999 Pfizer)   Q1: Little interest or pleasure in doing things 0   Q2: Feeling down, depressed or hopeless 0   PHQ-2 Score 0   Q1: Little interest or pleasure in doing things Not at all   Q2: Feeling down, depressed or hopeless Not at all   PHQ-2 Score 0           3/12/2024   Substance Use   If I could quit smoking, I would Neutral   I want to quit somking, worry about health affects  Neutral   Willing to make a plan to quit smoking Neutral   Willing to cut down before quitting Completely agree   Alcohol more than 3/day or more than 7/wk No   Do you use any other substances recreationally? (!) DECLINE     Social History     Tobacco Use    Smoking status: Every Day     Packs/day: 0.50     Years: 10.00     Additional pack years: 0.00     Total pack years: 5.00     Types: Cigarettes    Tobacco comments:     4-5 cigarettes per day     Vaping Use    Vaping Use: Never used   Substance Use Topics    Alcohol use: Not Currently     Comment: quit drinking 7-8y ago, when arrived to [place]; thinks he had problematic use in the past    Drug use: Not Currently     Comment: previously used methamphetamines in setting of divorce from ex-wife, last use was 2/7/24             3/12/2024   One time HIV Screening   Previous HIV test? No         3/12/2024   STI Screening   New sexual partner(s) since last STI/HIV test? (!) DECLINE         3/12/2024   Contraception/Family Planning   Questions about contraception or family planning No        Reviewed and updated as needed this visit by Provider   Tobacco  Allergies  Meds  Problems  Med Hx  Surg Hx  Fam Hx     Sexual Activity          BP Readings from Last 3 Encounters:   03/12/24 114/78   02/15/24 116/81   02/13/24 118/68    Wt Readings from Last 3 Encounters:   03/12/24 60.3 kg (133 lb)                  Patient Active Problem List   Diagnosis    Chemical dependency (H)     History reviewed. No pertinent surgical history.    Social History     Tobacco Use    Smoking status: Every Day     Packs/day: 0.50     Years: 10.00     Additional pack years: 0.00     Total pack years: 5.00     Types: Cigarettes    Smokeless tobacco: Not on file    Tobacco comments:     4-5 cigarettes per day     Substance Use Topics    Alcohol use: Not Currently     Comment: quit drinking 7-8y ago, when arrived to [place]; thinks he had problematic use in the past     Family History   Problem  "Relation Age of Onset    No Known Problems Mother     No Known Problems Father     No Known Problems Brother     No Known Problems Brother     No Known Problems Brother     No Known Problems Brother     No Known Problems Brother     No Known Problems Brother     No Known Problems Son     No Known Problems Son     No Known Problems Son          No current outpatient medications on file.     No Known Allergies  Recent Labs   Lab Test 02/13/24  1558   ALT 79*   CR 0.76   GFRESTIMATED >90   POTASSIUM 4.5          Review of Systems  Constitutional, HEENT, cardiovascular, pulmonary, gi and gu systems are negative, except as otherwise noted.     Objective    Exam  /78   Pulse 78   Temp 98.1  F (36.7  C) (Oral)   Resp 12   Ht 1.583 m (5' 2.32\")   Wt 60.3 kg (133 lb)   SpO2 98%   BMI 24.07 kg/m     Estimated body mass index is 24.07 kg/m  as calculated from the following:    Height as of this encounter: 1.583 m (5' 2.32\").    Weight as of this encounter: 60.3 kg (133 lb).    Physical Exam  GENERAL: alert and no distress  EYES: Eyes grossly normal to inspection, PERRL and conjunctivae and sclerae normal  HENT: ear canals and TM's normal, nose and mouth without ulcers or lesions  NECK: no adenopathy, no asymmetry, masses, or scars  RESP: lungs clear to auscultation - no rales, rhonchi or wheezes  CV: regular rate and rhythm, normal S1 S2, no S3 or S4, no murmur, click or rub, no peripheral edema  ABDOMEN: soft, nontender, no hepatosplenomegaly, no masses and bowel sounds normal  MS: no gross musculoskeletal defects noted, no edema  SKIN: no suspicious lesions or rashes  NEURO: Normal strength and tone, mentation intact and speech normal  PSYCH: mentation appears normal, affect normal/bright      Signed Electronically by: Melani Dorantes MD    "

## 2024-03-12 NOTE — Clinical Note
Hi, this patient would like to get rides to medical appointments if that is possible to set up. Thank you!

## 2024-03-12 NOTE — PROGRESS NOTES
Screening Questionnaire for Adult Immunization    Are you sick today?   No   Do you have allergies to medications, food, a vaccine component or latex?   No   Have you ever had a serious reaction after receiving a vaccination?   No   Do you have a long-term health problem with heart, lung, kidney, or metabolic disease (e.g., diabetes), asthma, a blood disorder, no spleen, complement component deficiency, a cochlear implant, or a spinal fluid leak?  Are you on long-term aspirin therapy?   No   Do you have cancer, leukemia, HIV/AIDS, or any other immune system problem?   No   Do you have a parent, brother, or sister with an immune system problem?   No   In the past 3 months, have you taken medications that affect  your immune system, such as prednisone, other steroids, or anticancer drugs; drugs for the treatment of rheumatoid arthritis, Crohn s disease, or psoriasis; or have you had radiation treatments?   No   Have you had a seizure, or a brain or other nervous system problem?   No   During the past year, have you received a transfusion of blood or blood    products, or been given immune (gamma) globulin or antiviral drug?   No   For women: Are you pregnant or is there a chance you could become       pregnant during the next month?   No   Have you received any vaccinations in the past 4 weeks?   No     Immunization questionnaire answers were all negative.      Patient instructed to remain in clinic for 15 minutes afterwards, and to report any adverse reactions.     Screening performed by Nilo Swartz MA on 3/12/2024 at 2:31 PM.

## 2024-03-13 ENCOUNTER — PATIENT OUTREACH (OUTPATIENT)
Dept: NURSING | Facility: CLINIC | Age: 29
End: 2024-03-13
Payer: COMMERCIAL

## 2024-03-13 ENCOUNTER — PATIENT OUTREACH (OUTPATIENT)
Dept: CARE COORDINATION | Facility: CLINIC | Age: 29
End: 2024-03-13

## 2024-03-13 DIAGNOSIS — Z71.89 OTHER SPECIFIED COUNSELING: Primary | Chronic | ICD-10-CM

## 2024-03-13 LAB
ALBUMIN SERPL BCG-MCNC: 4.5 G/DL (ref 3.5–5.2)
ALP SERPL-CCNC: 78 U/L (ref 40–150)
ALT SERPL W P-5'-P-CCNC: 27 U/L (ref 0–70)
AST SERPL W P-5'-P-CCNC: 25 U/L (ref 0–45)
BILIRUB DIRECT SERPL-MCNC: <0.2 MG/DL (ref 0–0.3)
BILIRUB SERPL-MCNC: 0.7 MG/DL
CHOLEST SERPL-MCNC: 200 MG/DL
FASTING STATUS PATIENT QL REPORTED: NO
HAV AB SER QL IA: REACTIVE
HCV AB SERPL QL IA: NONREACTIVE
HDLC SERPL-MCNC: 43 MG/DL
HIV 1+2 AB+HIV1 P24 AG SERPL QL IA: NONREACTIVE
LDLC SERPL CALC-MCNC: 93 MG/DL
NONHDLC SERPL-MCNC: 157 MG/DL
PROT SERPL-MCNC: 7.9 G/DL (ref 6.4–8.3)
TRIGL SERPL-MCNC: 319 MG/DL

## 2024-03-13 NOTE — PROGRESS NOTES
Clinic Care Coordination Contact  Community Health Worker Initial Outreach    CHW Initial Information Gathering:  Referral Source: PCP  Current living arrangement:: I live alone  Type of residence:: Private home - stairs  Community Resources: [facility] Programs, Chemical Dependency Services (SNAP. Outpatient treament M, W, T, F 9AM - 11AM.)  Supplies Currently Used at Home: None  Equipment Currently Used at Home: none  Informal Support system:: Friends  No PCP office visit in Past Year: No  Transportation means:: Friend  CHW Additional Questions  If ED/Hospital discharge, follow-up appointment scheduled as recommended?: N/A  Medication changes made following ED/Hospital discharge?: N/A  MyChart active?: No  Patient agreeable to assistance with activating MyChart?: No    Chart Review: Referral from PCP  Reason for Referral: Financial Support  Financial Support: Insurance  Additional Information: 27yo male who needs help getting a new insurance card.     Patient accepts CC: Yes. Patient scheduled for assessment with ROEL Gr on 3/13/23 at 2PM. Patient noted desire to discuss support with obtaining health insurance, greed card, and SSN card.   Patient shares that he was recently discharged from in-patient treatment and is attending out-patient treatment on  Mon, Wed, Thurs, and Fri from 9AM - 11AM.   FRW referral placed, patient will watch for follow up call.     Brooke Wray  Clinic Care Coordination  [facility]    Phone: [phone]

## 2024-03-14 ENCOUNTER — PATIENT OUTREACH (OUTPATIENT)
Dept: CARE COORDINATION | Facility: CLINIC | Age: 29
End: 2024-03-14
Payer: COMMERCIAL

## 2024-03-14 PROBLEM — E78.1 HYPERTRIGLYCERIDEMIA: Status: ACTIVE | Noted: 2024-03-14

## 2024-03-14 LAB
GAMMA INTERFERON BACKGROUND BLD IA-ACNC: 0.06 IU/ML
M TB IFN-G BLD-IMP: NEGATIVE
M TB IFN-G CD4+ BCKGRND COR BLD-ACNC: 9.94 IU/ML
MITOGEN IGNF BCKGRD COR BLD-ACNC: 0.01 IU/ML
MITOGEN IGNF BCKGRD COR BLD-ACNC: 0.04 IU/ML
QUANTIFERON MITOGEN: 10 IU/ML
QUANTIFERON NIL TUBE: 0.06 IU/ML
QUANTIFERON TB1 TUBE: 0.1 IU/ML
QUANTIFERON TB2 TUBE: 0.07

## 2024-03-23 NOTE — PROGRESS NOTES
Clinic Care Coordination Contact  [facility]/Voicemail    Clinical Data: Care Coordinator Outreach    Outreach Documentation Number of Outreach Attempt   3/13/2024  12:27 PM 1       Left message on patient's voicemail with call back information and requested return call.    Plan: Care Coordinator will send care coordination introduction letter with care coordinator contact information and explanation of care coordination services via mail. Care Coordinator will try to reach patient again in 1 month    ANETTE Gr, LGSW  Social Work Care Coordinator

## 2024-03-27 ENCOUNTER — PATIENT OUTREACH (OUTPATIENT)
Dept: CARE COORDINATION | Facility: CLINIC | Age: 29
End: 2024-03-27

## 2024-03-27 ENCOUNTER — APPOINTMENT (OUTPATIENT)
Dept: CARE COORDINATION | Facility: CLINIC | Age: 29
End: 2024-03-27
Payer: COMMERCIAL

## 2024-04-12 ENCOUNTER — PATIENT OUTREACH (OUTPATIENT)
Dept: CARE COORDINATION | Facility: CLINIC | Age: 29
End: 2024-04-12
Payer: COMMERCIAL

## 2024-04-12 NOTE — PROGRESS NOTES
Clinic Care Coordination Contact  [facility]/Voicemail    Clinical Data: Care Coordinator Outreach    Outreach Documentation Number of Outreach Attempt       4/12/2024   2:52 PM 1     CHW called patient x2. Unable to leave a message on patient's voicemail with call back information and request return call. Busy tone only.     Per chart review, patient no showed 3/13 SW Assessment. FRW referral closed on 3/21/22: Pt is getting counselor to help call county tomorrow to get insurance card. FRW does not help obtaining insurance cards.     Plan:  Care Coordinator will try to reach patient again in 10 business days.    Brooke Wray  [facility] Care Coordination  [facility]    Phone: [phone]

## 2024-04-22 ENCOUNTER — PATIENT OUTREACH (OUTPATIENT)
Dept: CARE COORDINATION | Facility: CLINIC | Age: 29
End: 2024-04-22
Payer: COMMERCIAL

## 2024-04-22 NOTE — PROGRESS NOTES
Clinic Care Coordination Contact  [facility]/Voicemail    Clinical Data:  Care Coordinator Outreach    Outreach Documentation Number of Outreach Attempt   3/13/2024  12:27 PM 1   4/12/2024   2:52 PM 1   4/22/2024  12:00 PM 1        was Unable to leave a message on patient's voicemail with call back information and requested return call.    Plan:  Care Coordinator will send unable to contact letter with care coordinator contact information via mail.  Due to initial needs identified,  Care Coordinator will try to reach patient again in 10 business days.      NEREYDA Howe / christopher Del [facility] Primary Care   Care Coordination  [facility] Services  4/22/2024 12:02 PM

## 2024-04-22 NOTE — LETTER
I would like to provide you with the enclosed information for your records.  As part of care coordination, we are developing care plans to assist in accomplishing your health care goals.  When we speak next, please feel free to let me know if you want to add or change any information on your care plans.     As always, feel free to contact me if you have any questions or concerns.  I look forward to working with you in the effort to achieve your health care and wellness goals .     Sincerely,       NEREYDA Howe / JOSHUA Khan  [facility] Primary [facility]   Care Coordination  [facility]  4/22/2024 11:54 AM                               [facility]  Patient Centered Plan of Care  About Me:        Patient Name:  Sarah Keenan    YOB: 1995  Age:         28 year old   [place] MRN:    [id number] Telephone Information:  Home Phone [phone]   Mobile [phone]       Address:  [address] [place] [address] Email address:  [email or web address]      Emergency Contact(s)    Name Relationship Lgl Grd Work Phone Home Phone Mobile Phone   1. SAY,EH Friend   [phone]   2. VISITOR,CLAUDY DIETRICH*                Primary language:  Shira     needed? Yes   [facility] [employer]:  [phone] op. 1  Other communication barriers:No data recorded  Preferred Method of Communication:     Current living arrangement: I live alone    Mobility Status/ Medical Equipment: No data recorded      Health Maintenance  Health Maintenance Reviewed: Due/Overdue  (Immunizations, vaccines)      My Access Plan  Medical Emergency 911   Primary Clinic Line [facility] [phone]   24 Hour Appointment Line [phone] or  [phone]) (toll-free)   24 Hour Nurse Line [phone] (toll-free)   Preferred Urgent Care No data recorded   Preferred Hospital No data recorded   Preferred Pharmacy [id number]  - [place], [place] - [address]     Behavioral Health Crisis Line The National Suicide Prevention Lifeline at 1-277.783.2735 or Text/Call 988           My Care Team Members  Patient Care Team         Relationship Specialty Notifications Start End    Melani Dorantes MD PCP - General Family Medicine  3/12/24     Phone: [phone] Fax: [phone]         [address] [place], [place] [address] , [address] [facility] [address]    Bere Escamilla APRN CNP Assigned Behavioral Health Provider   2/23/24     Phone: [phone] Fax: [phone]         [facility] [address] [facility] [address]    Brooke Wray CHW Community Health Worker Primary Care - CC Admissions 3/13/24     Phone: [phone]         Patti Del Castillo LGSW Lead Care Coordinator  Admissions 3/13/24                 My Care Plans  Self Management and Treatment Plan    Care Plan  Care Plan: [employer]       Problem: Health Insurance       Goal: I will apply for health insurance through [employer] within the next 90 days.       Start Date: 3/13/2024 Expected End Date: 6/13/2024    This Visit's Progress: 10%    Priority: High    Note:     Barriers: Language  Strengths: Accepting of support   Patient expressed understanding of goal: Yes     Action steps to achieve this goal:   1.  I will answer my phone when I am contacted by the [facility] FRW to schedule an initial appointment.   2.  I will provide all necessary documentation and information to complete a [place] application.   3.  I will call my FRW if I have questions or concerns regarding my county benefits application.                                 Action Plans on File:                       Advance Care Plans/Directives:             My Medical and Care Information  Problem List   Patient Active Problem List   Diagnosis    Chemical dependency (H)    Severe stimulant use disorder (H)    History of incarceration    Hypertriglyceridemia      Current Medications and Allergies:  See  printed Medication Report.    Care Coordination Start Date: 3/12/2024   Frequency of Care Coordination: monthly, more frequently as needed     Form Last Updated: 04/22/2024

## 2024-04-22 NOTE — LETTER
[facility] CARE COORDINATION  [address] [place] [address], [address]  [facility] [address]     April 22, 2024    Sarah Keenan  [address] [place] [address]      Dear Sarah,    I am a clinic care coordinator who works with Melani Dorantes MD with the [facility]. I have been trying to reach you recently to introduce Clinic Care Coordination. Below is a description of clinic care coordination and how I can further assist you.       The clinic care coordination team is made up of a registered nurse, , financial resource worker and community health worker who understand the health care system. The goal of clinic care coordination is to help you manage your health and improve access to the health care system. Our team works alongside your provider to assist you in determining your health and social needs. We can help you obtain health care and community resources, providing you with necessary information and education. We can work with you through any barriers and develop a care plan that helps coordinate and strengthen the communication between you and your care team.  Our services are voluntary and are offered without charge to you personally.    Please feel free to contact Patti Del Castillo at [phone], with any questions or concerns regarding care coordination and what we can offer.      We are focused on providing you with the highest-quality healthcare experience possible.    Sincerely,       NEREYDA Howe / for Patti Del Castillo ELIANA  [facility] Primary Care   Care Coordination  [facility]  4/22/2024 11:51 AM     Enclosed: I have enclosed a copy of the Patient Centered Plan of Care. This has helpful information and goals that we have talked about. Please keep this in an easy to access place to use as needed.

## 2024-05-07 ENCOUNTER — PATIENT OUTREACH (OUTPATIENT)
Dept: CARE COORDINATION | Facility: CLINIC | Age: 29
End: 2024-05-07
Payer: COMMERCIAL

## 2024-05-07 DIAGNOSIS — Z71.89 OTHER SPECIFIED COUNSELING: Primary | Chronic | ICD-10-CM

## 2024-05-07 NOTE — PROGRESS NOTES
Clinic Care Coordination Contact  Community Health Worker Initial Outreach  Spoke with patient through Shira  ID [id number]    Per chart review, patient no showed 3/13 SW Assessment. FRW referral closed on 3/21/22. Patient noted desire to discuss support with obtaining health insurance, greed card, and SSN.    CHW reviewed above and inquired if patient would like to reschedule. Patient shares that he wasn't aware of appointment with CCSW. Patient prefers in-person appointment, scheduled on 5/14 at 3PM.    Patient repeated that he needs an insurance card multiple times. CHW reviewed that [place]. He will need to re-apply for health insurance in order to get the card.   New FRW referral submitted.     Brooke Wray  Clinic Care Coordination  [facility]    Phone: [phone]

## 2024-05-07 NOTE — PROGRESS NOTES
Clinic Care Coordination Contact  [facility]/Theo  Shira  ID [id number]    Clinical Data: Care Coordinator Outreach    Outreach Documentation Number of Outreach Attempt           5/7/2024   1:24 PM 1     CHW called patient x2, voicemail is not set up. On third attempt, it seems like someone picked up but did not speak.     Per chart review, patient no showed 3/13 SW Assessment. FRW referral closed on 3/21/22. Patient noted desire to support with obtaining health insurance, greed card, and SSN.    Plan: Care Coordinator will try to reach patient again in 1-2 business days.    Brooke Wray  [facility] Care Coordination  [facility]    Phone: [phone]

## 2024-05-09 ENCOUNTER — PATIENT OUTREACH (OUTPATIENT)
Dept: CARE COORDINATION | Facility: CLINIC | Age: 29
End: 2024-05-09
Payer: COMMERCIAL

## 2024-05-14 ENCOUNTER — ALLIED HEALTH/NURSE VISIT (OUTPATIENT)
Dept: NURSING | Facility: CLINIC | Age: 29
End: 2024-05-14
Payer: COMMERCIAL

## 2024-05-20 ENCOUNTER — PATIENT OUTREACH (OUTPATIENT)
Dept: CARE COORDINATION | Facility: CLINIC | Age: 29
End: 2024-05-20
Payer: COMMERCIAL

## 2024-06-11 ENCOUNTER — PATIENT OUTREACH (OUTPATIENT)
Dept: CARE COORDINATION | Facility: CLINIC | Age: 29
End: 2024-06-11
Payer: COMMERCIAL

## 2024-06-18 ENCOUNTER — PATIENT OUTREACH (OUTPATIENT)
Dept: CARE COORDINATION | Facility: CLINIC | Age: 29
End: 2024-06-18
Payer: COMMERCIAL

## 2024-06-18 NOTE — PROGRESS NOTES
Clinic Care Coordination Contact    Situation: Patient chart reviewed by care coordinator.    Background: pt would like to apply for mnsure    Assessment: FRW referral completed    Plan/Recommendations: CCSW closed CCC encounter as pt has FRW goal only.     ANETTE Gr, [facility]  Social Work Care Coordinator

## 2024-06-18 NOTE — PROGRESS NOTES
Clinic Care Coordination Contact  Community Health Worker Follow Up  Spoke with patient - Shira  ID Carine     Care Gaps:     Health Maintenance Due   Topic Date Due    HEPATITIS A IMMUNIZATION (1 of 2 - Risk 2-dose series) Never done    DTAP/TDAP/TD IMMUNIZATION (3 - Tdap) 04/19/2018    COVID-19 Vaccine (2 - 2023-24 season) 09/01/2023     Scheduled 9/12 at 2:10PM with Dr. Merino      Care Plan:   Care Plan: [employer]       Problem: Health Insurance       Goal: I will apply for health insurance through [employer] within the next 90 days.       Start Date: 3/13/2024 Expected End Date: 6/13/2024    This Visit's Progress: 10% Recent Progress: 10%    Priority: High    Note:     Barriers: Language  Strengths: Accepting of support   Patient expressed understanding of goal: Yes     Action steps to achieve this goal:   1.  I will answer my phone when I am contacted by the [facility] FRW to schedule an initial appointment.   2.  I will provide all necessary documentation and information to complete a [employer] application.   3.  I will call my FRW if I have questions or concerns regarding my county benefits application.   Note: New FRW referral submitted on 5/7/24.                            Intervention and Education during outreach:   [place] yet.   CHW reviewed above. Patient will continue to work with FRW.     CHW Plan: CHW to follow up in 1 month    Brooke Wray  Clinic Care Coordination  [facility]    Phone: [phone]

## 2024-06-21 ENCOUNTER — PATIENT OUTREACH (OUTPATIENT)
Dept: CARE COORDINATION | Facility: CLINIC | Age: 29
End: 2024-06-21
Payer: COMMERCIAL

## 2024-07-09 ENCOUNTER — APPOINTMENT (OUTPATIENT)
Dept: INTERPRETER SERVICES | Facility: CLINIC | Age: 29
End: 2024-07-09
Payer: COMMERCIAL

## 2024-07-09 ENCOUNTER — PATIENT OUTREACH (OUTPATIENT)
Dept: CARE COORDINATION | Facility: CLINIC | Age: 29
End: 2024-07-09
Payer: COMMERCIAL

## 2024-07-10 ENCOUNTER — PATIENT OUTREACH (OUTPATIENT)
Dept: CARE COORDINATION | Facility: CLINIC | Age: 29
End: 2024-07-10
Payer: COMMERCIAL

## 2024-07-19 ENCOUNTER — PATIENT OUTREACH (OUTPATIENT)
Dept: CARE COORDINATION | Facility: CLINIC | Age: 29
End: 2024-07-19
Payer: COMMERCIAL

## 2024-07-29 ENCOUNTER — APPOINTMENT (OUTPATIENT)
Dept: INTERPRETER SERVICES | Facility: CLINIC | Age: 29
End: 2024-07-29
Payer: COMMERCIAL

## 2024-07-29 ENCOUNTER — PATIENT OUTREACH (OUTPATIENT)
Dept: CARE COORDINATION | Facility: CLINIC | Age: 29
End: 2024-07-29
Payer: COMMERCIAL

## 2024-07-31 ENCOUNTER — PATIENT OUTREACH (OUTPATIENT)
Dept: CARE COORDINATION | Facility: CLINIC | Age: 29
End: 2024-07-31
Payer: COMMERCIAL

## 2024-08-01 ENCOUNTER — PATIENT OUTREACH (OUTPATIENT)
Dept: CARE COORDINATION | Facility: CLINIC | Age: 29
End: 2024-08-01
Payer: COMMERCIAL

## 2024-08-20 ENCOUNTER — PATIENT OUTREACH (OUTPATIENT)
Dept: CARE COORDINATION | Facility: CLINIC | Age: 29
End: 2024-08-20
Payer: COMMERCIAL

## 2024-08-27 ENCOUNTER — PATIENT OUTREACH (OUTPATIENT)
Dept: CARE COORDINATION | Facility: CLINIC | Age: 29
End: 2024-08-27
Payer: COMMERCIAL

## 2024-09-12 ENCOUNTER — OFFICE VISIT (OUTPATIENT)
Dept: FAMILY MEDICINE | Facility: CLINIC | Age: 29
End: 2024-09-12
Attending: FAMILY MEDICINE
Payer: COMMERCIAL

## 2024-09-12 VITALS
WEIGHT: 116 LBS | DIASTOLIC BLOOD PRESSURE: 60 MMHG | SYSTOLIC BLOOD PRESSURE: 98 MMHG | TEMPERATURE: 99.2 F | HEART RATE: 102 BPM | BODY MASS INDEX: 21.35 KG/M2 | HEIGHT: 62 IN | OXYGEN SATURATION: 98 % | RESPIRATION RATE: 16 BRPM

## 2024-09-12 DIAGNOSIS — Z23 NEED FOR VACCINATION: ICD-10-CM

## 2024-09-12 DIAGNOSIS — Z56.2: ICD-10-CM

## 2024-09-12 DIAGNOSIS — F17.200 NICOTINE DEPENDENCE, UNCOMPLICATED, UNSPECIFIED NICOTINE PRODUCT TYPE: ICD-10-CM

## 2024-09-12 DIAGNOSIS — F15.21 METHAMPHETAMINE USE DISORDER, SEVERE, IN EARLY REMISSION (H): Primary | ICD-10-CM

## 2024-09-12 PROBLEM — F15.20 SEVERE STIMULANT USE DISORDER (H): Status: RESOLVED | Noted: 2024-03-12 | Resolved: 2024-09-12

## 2024-09-12 PROCEDURE — T1013 SIGN LANG/ORAL INTERPRETER: HCPCS | Mod: U4

## 2024-09-12 PROCEDURE — 99213 OFFICE O/P EST LOW 20 MIN: CPT | Performed by: FAMILY MEDICINE

## 2024-09-12 PROCEDURE — G2211 COMPLEX E/M VISIT ADD ON: HCPCS | Performed by: FAMILY MEDICINE

## 2024-09-12 SDOH — ECONOMIC STABILITY - INCOME SECURITY: THREAT OF JOB LOSS: Z56.2

## 2024-09-12 NOTE — Clinical Note
Hello, I saw this patient on Thursday 9/12 and he brought in a request for information from [place] health care programs (see note for details). He has a new phone number, updated in Epic. Could you please reach out to him? Thank you!

## 2024-09-12 NOTE — PROGRESS NOTES
Assessment & Plan     Methamphetamine use disorder, severe, in early remission (H)  He has completed treatment program.  He has not used stimulants.  Congratulated on sobriety.    Need for vaccination  Declines flu shot today.    Employment insecurity  He brings in medical opinion paperwork today.  He says that he would like to work but was told that he cannot work due to an issue with his green card.  Will ask care coordination to assist.  - Primary Care - Care Coordination Referral; Future    Nicotine dependence, uncomplicated, unspecified nicotine product type  He is precontemplative in terms of quitting.  He would like to wait until he his completely ready to quit smoking to try.      The longitudinal plan of care for the diagnosis(es)/condition(s) as documented were addressed during this visit. Due to the added complexity in care, I will continue to support Sarah in the subsequent management and with ongoing continuity of care.      Follow-up for annual preventive    Subjective   Sarah is a 29 year old, presenting for the following health issues:  Forms and Follow Up      9/12/2024     2:28 PM   Additional Questions   Roomed by marla montano         9/12/2024   Forms   Any forms needing to be completed Yes        History of Present Illness       Reason for visit:  Forms      No changes in health  Has been going to peer support group once a week  Not doing anything else, staying home, hanging out with kids, going for walk  Graduated treatment program that was 4x per week - has not used any drugs since then  Was not able to apply for a job because of something to do with his green card  If green card were not an issue, he would like to work    Smoking - doesn't feel like it is time to quit smoking yet      Has a request for information from [place] health care programs - requesting info about tax filing status and marital status - will you still file taxes jointly with Orlando Perry? What is your marital status? Provide requested  "info to METS by 9/16/24  Has a new phone number, updated in epic              Objective    BP 98/60   Pulse 102   Temp 99.2  F (37.3  C) (Oral)   Resp 16   Ht 1.567 m (5' 1.69\")   Wt 52.6 kg (116 lb)   SpO2 98%   BMI 21.43 kg/m    Body mass index is 21.43 kg/m .  Wt Readings from Last 3 Encounters:   09/12/24 52.6 kg (116 lb)   03/12/24 60.3 kg (133 lb)       Physical Exam               Signed Electronically by: Melani Dorantes MD    "

## 2024-09-13 ENCOUNTER — TELEPHONE (OUTPATIENT)
Dept: FAMILY MEDICINE | Facility: CLINIC | Age: 29
End: 2024-09-13
Payer: COMMERCIAL

## 2024-09-13 NOTE — TELEPHONE ENCOUNTER
Medical Opinion form reviewed, completed, signed by provider.     Returned via mail to [facility] at address below.     [address] [place] [address]     Copied to EHR scanning.

## 2024-09-16 ENCOUNTER — PATIENT OUTREACH (OUTPATIENT)
Dept: CARE COORDINATION | Facility: CLINIC | Age: 29
End: 2024-09-16
Payer: COMMERCIAL

## 2024-09-16 NOTE — Clinical Note
Just want to update you regarding this patient's status on Green Card. This patient has been living in the [place] for over 7 years and has not received Green Card yet and the bad news is that he had past legal issues so he's still on probation which prevent him to receive Green Card. There's nothing we can do to help at this time and he'll have to wait until after 2025 when his probation lifted. Manuel is helping with this and he's still going through treatment.    Thanks, Phu.

## 2024-09-16 NOTE — PROGRESS NOTES
Clinic Care Coordination Contact  Community Health Worker Initial Outreach    CHW Initial Information Gathering:  Referral Source: PCP  Preferred Hospital: [facility]  [phone]  Preferred Urgent Care: [facility], [phone]  Current living arrangement:: I live in a private home  Type of residence:: Private home - stairs  Community Resources: Drug Abuse  Supplies Currently Used at Home: None  Equipment Currently Used at Home: none  Informal Support system:: Friends, Other  No PCP office visit in Past Year: No  Transportation means:: Medical transport, Friend  CHW Additional Questions  If ED/Hospital discharge, follow-up appointment scheduled as recommended?: N/A  Medication changes made following ED/Hospital discharge?: N/A  MyChart active?: No  Patient agreeable to assistance with activating MyChart?: No    Patient accepts CC: Yes. Patient scheduled for assessment with DINORA MONROY on 9/26/2024 at 10:00 AM. Patient noted desire to discuss 30yo male with methamphetamine use disorder in remission, he would like to work but understood that he cannot because of an issue with green card - brought in medical opinion paperwork but does not have any limitations to work at this time and he would like to work. Can you please figure out what the issue is and connect with job training programs, or something similar?.     Per patient, he's receiving treatment and therapy at [facility] for addiction. Patient stated he has some past legal issues and someone from [facility] is helping to regain his Green Card but he's not sure the status. Patient will follow up with someone at [facility] and will update DINORA MONROY at the in-person visit.     Received call back from [facility] with CLAUDIA obtained that patient's is still on probation and may not be able to qualify until and after 2025 once probation is lifted.

## 2024-09-26 ENCOUNTER — ALLIED HEALTH/NURSE VISIT (OUTPATIENT)
Dept: NURSING | Facility: CLINIC | Age: 29
End: 2024-09-26
Payer: COMMERCIAL

## 2024-09-26 DIAGNOSIS — Z71.89 COMPLEX CARE COORDINATION: Primary | ICD-10-CM

## 2024-09-26 PROCEDURE — 99207 PR NO CHARGE LOS: CPT

## 2024-09-26 ASSESSMENT — ACTIVITIES OF DAILY LIVING (ADL): DEPENDENT_IADLS:: INDEPENDENT

## 2024-09-26 NOTE — PROGRESS NOTES
Clinic Care Coordination Contact  Clinic Care Coordination Contact  OUTREACH    Referral Information:  Referral Source: PCP         Chief Complaint   Patient presents with    Clinic Care Coordination - Initial        Universal Utilization: appropriate     Utilization      No Show Count (past year)  109             ED Visits  1             Hospital Admissions  0                    Current as of: 9/26/2024 10:44 AM                Clinical Concerns:  Current Medical Concerns:  none    Current Behavioral Concerns: none    Education Provided to patient: CCC education, support and resources      Health Maintenance Reviewed:    Clinical Pathway: None    Medication Management:  Medication review status: Medications reviewed and no changes reported per patient.             Functional Status:  Dependent ADLs:: Independent  Dependent IADLs:: Independent  Bed or wheelchair confined:: No  Mobility Status: Independent    Living Situation:  Current living arrangement:: I live in a private home  Type of residence:: Private home - stairs    Lifestyle & Psychosocial Needs:    Social Determinants of Health     Food Insecurity: Low Risk  (3/12/2024)    Food Insecurity     Within the past 12 months, did you worry that your food would run out before you got money to buy more?: No     Within the past 12 months, did the food you bought just not last and you didn t have money to get more?: No   Depression: Not at risk (3/12/2024)    PHQ-2     PHQ-2 Score: 0   Housing Stability: Low Risk  (3/12/2024)    Housing Stability     Do you have housing? : Yes     Are you worried about losing your housing?: No   Tobacco Use: High Risk (9/12/2024)    Patient History     Smoking Tobacco Use: Every Day     Smokeless Tobacco Use: Unknown     Passive Exposure: Not on file   Financial Resource Strain: Low Risk  (3/12/2024)    Financial Resource Strain     Within the past 12 months, have you or your family members you live with been unable to get utilities  (heat, electricity) when it was really needed?: No   Alcohol Use: Not on file   Transportation Needs: High Risk (3/12/2024)    Transportation Needs     Within the past 12 months, has lack of transportation kept you from medical appointments, getting your medicines, non-medical meetings or appointments, work, or from getting things that you need?: Yes   Physical Activity: Not on file   Interpersonal Safety: Low Risk  (9/12/2024)    Interpersonal Safety     Do you feel physically and emotionally safe where you currently live?: Yes     Within the past 12 months, have you been hit, slapped, kicked or otherwise physically hurt by someone?: No     Within the past 12 months, have you been humiliated or emotionally abused in other ways by your partner or ex-partner?: No   Stress: Not on file   Social Connections: Not on file   Health Literacy: Not on file              Voodoo or spiritual beliefs that impact treatment:: No  Mental health DX:: No  Mental health management concern (GOAL):: No  Chemical Dependency Status: Past Concern  Chemical Dependency Management: Previous treatment  Informal Support system:: Friends, Other        Resources and Interventions:  Current Resources:      Community Resources: Drug Abuse  Supplies Currently Used at Home: None  Equipment Currently Used at Home: none  Employment Status: unemployed              Referrals Placed: Community Resources  ([facility] [place] [facility] [place])     Care Plan:  Care Plan: Obtain green card       Problem: Seeking Citizenship/Green Card/Social Security       Goal: Have appropriate information and resources to obtain Citizenship/Green Card/Social Security       Start Date: 9/26/2024    This Visit's Progress: 20%    Priority: High    Note:     Barriers: language, navigating immigration services  Strengths: motivated to obtain green card  Patient expressed understanding of goal: yes  Action steps to achieve this goal:  1. I will answer the phone when [facility] [place] calls me  2. I will provide any and all information I am able to   3. I will follow up with CCC at next outreach and ask for further support if needed.                                Patient/Caregiver understanding: yes       Future Appointments                In 5 months Melani Dorantes MD [facility] EMILY Alonso SPRO            Plan: CCSW, pt and Shira  Jabari ID [id number] completed breif assessment. Pt is residing with friends in a single family home. He receives [employer] benefits and stated he does not have enough food. CCSW placed food resource navigator referral. Pt completed treatment and showed CCSW his certificate of completion. Pt was very proud. Pt stated he was issued a green card, it was sent to his ex wife's home 4-5 years ago and she sent it back in the mail. Pt needs that document so he can gain employment. CCSW contacted Markie Sharma at [employer] [place] [employer] [place]. [phone] Markie stated that they have a Shira worker that will reach out to pt and support him in getting his green card.       Patti Del Castillo MSW, [facility]  Social Work Care Coordinator

## 2024-09-26 NOTE — LETTER
[facility] CARE COORDINATION  [facility]  September 26, 2024    Sarah Keenan  [address] [place] [address]      Dear Sarah,    I am a clinic care coordinator who works with Melani Dorantes MD with the [facility]. I wanted to thank you for spending the time to talk with me.  Below is a description of clinic care coordination and how I can further assist you.       The clinic care coordination team is made up of a registered nurse, , financial resource worker and community health worker who understand the health care system. The goal of clinic care coordination is to help you manage your health and improve access to the health care system. Our team works alongside your provider to assist you in determining your health and social needs. We can help you obtain health care and community resources, providing you with necessary information and education. We can work with you through any barriers and develop a care plan that helps coordinate and strengthen the communication between you and your care team.  Our services are voluntary and are offered without charge to you personally.    Please feel free to contact me with any questions or concerns regarding care coordination and what we can offer.      We are focused on providing you with the highest-quality healthcare experience possible.    Sincerely,     Patti Del Castillo, ANETTE, [facility]  Social Work Care Coordinator

## 2024-09-26 NOTE — LETTER
[facility]  Patient Centered Plan of Care  About Me:        Patient Name:  Sarah Keenan    YOB: 1995  Age:         29 year old   Lamine MRN:    [id number] Telephone Information:  Home Phone [phone]   Mobile [phone]       Address:  [address] [place] [address] Email address:  [email or web address]      Emergency Contact(s)    Name Relationship Lgl Grd Work Phone Home Phone Mobile Phone   1. SAY,EH Friend   [phone]   2. VISITOR,CLAUDY DIETRICH*                Primary language:  Shira     needed? Yes   [employer]:  [phone] op. 1  Other communication barriers:No data recorded  Preferred Method of Communication:     Current living arrangement: I live in a private home    Mobility Status/ Medical Equipment: Independent        Health Maintenance  Health Maintenance Reviewed: Due/Overdue   Health Maintenance Due   Topic Date Due    HEPATITIS A IMMUNIZATION (1 of 2 - Risk 2-dose series) Never done    DTAP/TDAP/TD IMMUNIZATION (3 - Tdap) 04/19/2018    INFLUENZA VACCINE (1) 09/01/2024    COVID-19 Vaccine (2 - 2024-25 season) 09/01/2024           My Access Plan  Medical Emergency 911   Primary Clinic Line [facility] [phone]   24 Hour Appointment Line [phone] or  [phone]) (toll-free)   24 Hour Nurse Line [phone] (toll-free)   Preferred Urgent Care [facility] [phone]     Preferred Hospital [facility]  [phone]     Preferred [facility]acy [id number] - [place], [place] - [address]     Behavioral Health Crisis Line The National Suicide Prevention Lifeline at 1-379.650.2489 or Text/Call 598           My Care Team Members  Patient Care Team         Relationship Specialty Notifications Start End    Melani Dorantes MD PCP - General Family Medicine  3/12/24     Phone: [phone]  Fax: [phone]         [address] [facility] [address]    Bere Escamilla APRN CNP Assigned Behavioral Health Provider   2/23/24     Phone: [phone] Fax: [phone]         [facility] [address] [facility] [address]    Melani Dorantes MD Assigned PCP   4/23/24     Phone: [phone] Fax: [phone]         [address], [address] [facility] [address]    Phu Olguin CHW Community Health Worker Primary Care - CC Admissions 9/16/24     Phone: [phone] Fax: [phone]         [address] [facility] [address] [facility] [address]    Patti Del Castillo, JOSHUA Lead Care Coordinator  Admissions 9/16/24     [facility]    9/16/24     [facility]    Phone: [phone]                     My Care Plans  Self Management and Treatment Plan    Care Plan  Care Plan: Obtain green card       Problem: Seeking Citizenship/Green Card/Social Security       Goal: Have appropriate information and resources to obtain Citizenship/Green Card/Social Security       Start Date: 9/26/2024    This Visit's Progress: 20%    Priority: High    Note:     Barriers: language, navigating immigration services  Strengths: motivated to obtain green card  Patient expressed understanding of goal: yes  Action steps to achieve this goal:  1. I will answer the phone when [facility] [place] calls me  2. I will provide any and all information I am able to   3. I will follow up with CCC at next outreach and ask for further support if needed.                                Action Plans on File:                       Advance Care Plans/Directives:             My Medical and Care Information  Problem List   Patient Active Problem List   Diagnosis    Chemical dependency (H)    History of incarceration    Hypertriglyceridemia    Methamphetamine use disorder, severe, in early remission (H)      Current Medications and Allergies:  See printed Medication Report.    Care Coordination Start Date: 9/13/2024    Frequency of Care Coordination: monthly, more frequently as needed     Form Last Updated: 09/26/2024

## 2024-09-27 ENCOUNTER — PATIENT OUTREACH (OUTPATIENT)
Dept: CARE COORDINATION | Facility: CLINIC | Age: 29
End: 2024-09-27
Payer: COMMERCIAL

## 2024-09-27 NOTE — PROGRESS NOTES
Food Resource Navigator Contact    FRN - Initial Outreach    Reason for call: Other: food insecurity     Food Insecurity: Low Risk  (3/12/2024)    Food Insecurity     Within the past 12 months, did you worry that your food would run out before you got money to buy more?: No     Within the past 12 months, did the food you bought just not last and you didn t have money to get more?: No     Housing Stability: Low Risk  (3/12/2024)    Housing Stability     Do you have housing? : Yes     Are you worried about losing your housing?: No     Financial Resource Strain: Low Risk  (3/12/2024)    Financial Resource Strain     Within the past 12 months, have you or your family members you live with been unable to get utilities (heat, electricity) when it was really needed?: No     Transportation Needs: High Risk (3/12/2024)    Transportation Needs     Within the past 12 months, has lack of transportation kept you from medical appointments, getting your medicines, non-medical meetings or appointments, work, or from getting things that you need?: Yes       The patient was provided with the following food resources:  [employer] food shelves  [employer] [place]/Food Voucher    The patient was provided the following community resources:  None    I have discussed the following goals with the patient: Sarah Madden to use [employer], [employer] and community food resources to improve food access.     Spent 32 minutes in consult with the patient.     Tamy Johnson   [facility] Food Resource Navigator  [employer] is [employer]   [phone]

## 2024-10-10 ENCOUNTER — TELEPHONE (OUTPATIENT)
Dept: FAMILY MEDICINE | Facility: CLINIC | Age: 29
End: 2024-10-10
Payer: COMMERCIAL

## 2024-10-10 NOTE — TELEPHONE ENCOUNTER
Forms/Letter Request    Type of form/letter: Medical opinion      Do we have the form/letter: Yes: Place in Dr. Merino blue folder    Who is the form from? Patient    Where did/will the form come from? Patient or family brought in       When is form/letter needed by: ASAP    How would you like the form/letter returned:     Patient Notified form requests are processed in 5-7 business days:Yes    Okay to leave a detailed message?: Yes at Cell number on file:    Telephone Information:   Mobile [phone]      Note: Reviewed chart and writer see there was Medical Opinion done on 9/13. Patient stated [facility] received it but they closed his case. Patient would like pcp to fill out another one as he is not able to obtained a job due to Green card situation ( see note from 9/16) and he needs his case to re-open to get rental assistance.

## 2024-10-16 NOTE — TELEPHONE ENCOUNTER
Form reviewed, completed, signed by provider.     Mailed to patient home address. Copy also placed at [facility]  in case patient picks up at .     Copied to EHR scanning.

## 2024-10-30 ENCOUNTER — PATIENT OUTREACH (OUTPATIENT)
Dept: CARE COORDINATION | Facility: CLINIC | Age: 29
End: 2024-10-30
Payer: COMMERCIAL

## 2024-10-30 NOTE — PROGRESS NOTES
Clinic Care Coordination Contact  Patient has completed all goals with Clinic Care Coordination.  Please review the chart and confirm if maintenance/graduate is approved.    -Patient is still on probation until sometimes after 2026 in order to qualify to apply for green card and citizenship.  -Patient is receiving helps through [employer] with this process.  -Patient is living with someone or relative and war referred to food navigator.   -Patient was informed to call with questions or concerns.   -Chart review routed to CCC SW to further review and advise.       CCSW closed CCC encounter as pt is on probation so we are unable to assist him with Citizenship until he is off of probation.  Patti Del Castillo, MSW, SW  Social Work Care Coordinator